# Patient Record
Sex: FEMALE | Race: WHITE | NOT HISPANIC OR LATINO | Employment: FULL TIME | URBAN - METROPOLITAN AREA
[De-identification: names, ages, dates, MRNs, and addresses within clinical notes are randomized per-mention and may not be internally consistent; named-entity substitution may affect disease eponyms.]

---

## 2018-06-04 ENCOUNTER — HOSPITAL ENCOUNTER (OUTPATIENT)
Dept: RADIOLOGY | Facility: MEDICAL CENTER | Age: 43
End: 2018-06-04
Attending: FAMILY MEDICINE
Payer: COMMERCIAL

## 2018-06-04 DIAGNOSIS — Z12.31 VISIT FOR SCREENING MAMMOGRAM: ICD-10-CM

## 2018-06-04 PROCEDURE — 77067 SCR MAMMO BI INCL CAD: CPT

## 2019-10-02 ENCOUNTER — OFFICE VISIT (OUTPATIENT)
Dept: URGENT CARE | Facility: PHYSICIAN GROUP | Age: 44
End: 2019-10-02
Payer: COMMERCIAL

## 2019-10-02 VITALS
OXYGEN SATURATION: 93 % | RESPIRATION RATE: 18 BRPM | TEMPERATURE: 97.5 F | BODY MASS INDEX: 28.82 KG/M2 | DIASTOLIC BLOOD PRESSURE: 88 MMHG | WEIGHT: 173 LBS | HEIGHT: 65 IN | SYSTOLIC BLOOD PRESSURE: 138 MMHG | HEART RATE: 85 BPM

## 2019-10-02 DIAGNOSIS — J45.21 MILD INTERMITTENT REACTIVE AIRWAY DISEASE WITH ACUTE EXACERBATION: ICD-10-CM

## 2019-10-02 DIAGNOSIS — J04.2 LARYNGOTRACHEITIS: ICD-10-CM

## 2019-10-02 PROCEDURE — 99203 OFFICE O/P NEW LOW 30 MIN: CPT | Performed by: EMERGENCY MEDICINE

## 2019-10-02 RX ORDER — ENALAPRIL MALEATE 10 MG/1
10 TABLET ORAL
Refills: 0 | COMMUNITY
Start: 2019-09-16

## 2019-10-02 RX ORDER — FLUTICASONE PROPIONATE 220 UG/1
1 AEROSOL, METERED RESPIRATORY (INHALATION) 2 TIMES DAILY
Qty: 1 INHALER | Refills: 0 | Status: SHIPPED | OUTPATIENT
Start: 2019-10-02 | End: 2021-01-21

## 2019-10-02 RX ORDER — ALBUTEROL SULFATE 90 UG/1
AEROSOL, METERED RESPIRATORY (INHALATION)
Refills: 1 | COMMUNITY
Start: 2019-09-23 | End: 2021-01-21 | Stop reason: SDUPTHER

## 2019-10-02 RX ORDER — INHALER, ASSIST DEVICES
1 SPACER (EA) MISCELLANEOUS ONCE
Qty: 1 EACH | Refills: 0 | Status: SHIPPED | OUTPATIENT
Start: 2019-10-02 | End: 2019-10-02

## 2019-10-02 RX ORDER — PREDNISONE 20 MG/1
TABLET ORAL
Qty: 16 TAB | Refills: 0 | Status: SHIPPED | OUTPATIENT
Start: 2019-10-02 | End: 2021-01-21

## 2019-10-02 RX ORDER — PRAVASTATIN SODIUM 10 MG
TABLET ORAL
Refills: 3 | COMMUNITY
Start: 2019-08-07 | End: 2022-06-30

## 2019-10-02 ASSESSMENT — ENCOUNTER SYMPTOMS
SORE THROAT: 1
COUGH: 1
VOMITING: 0
WHEEZING: 1
ABDOMINAL PAIN: 0
RHINORRHEA: 0
NAUSEA: 0
SHORTNESS OF BREATH: 0
SPUTUM PRODUCTION: 0
DIARRHEA: 0
HEMOPTYSIS: 0
SINUS PAIN: 0
FEVER: 0

## 2019-10-02 NOTE — PROGRESS NOTES
Subjective:      Jennifer Monge is a 44 y.o. female who presents with Cough (with congestion and loss of voice, hx of asthma )            URI    This is a new problem. Episode onset: 8 days. The problem has been gradually worsening. There has been no fever. Associated symptoms include congestion, coughing, a sore throat and wheezing. Pertinent negatives include no abdominal pain, chest pain, diarrhea, ear pain, nausea, plugged ear sensation, rash, rhinorrhea, sinus pain or vomiting. She has tried inhaler use for the symptoms. The treatment provided mild relief.       Review of Systems   Constitutional: Negative for fever.   HENT: Positive for congestion and sore throat. Negative for ear pain, rhinorrhea and sinus pain.         Hoarseness   Respiratory: Positive for cough and wheezing. Negative for hemoptysis, sputum production and shortness of breath.    Cardiovascular: Negative for chest pain.   Gastrointestinal: Negative for abdominal pain, diarrhea, nausea and vomiting.   Skin: Negative for rash.     PMH:  has a past medical history of ASTHMA and Migraine.  MEDS:   Current Outpatient Medications:   •  albuterol 108 (90 Base) MCG/ACT Aero Soln inhalation aerosol, INHALE 2 PUFFS BY MOUTH EVERY 4 HOURS AS NEEDED FOR SHORTNESS OF BREATH WHEEZING, Disp: , Rfl: 1  •  enalapril (VASOTEC) 10 MG Tab, Take 10 mg by mouth., Disp: , Rfl: 0  •  pravastatin (PRAVACHOL) 10 MG Tab, TAKE 1 TABLET BY MOUTH ONCE DAILY . APPOINTMENT REQUIRED FOR FUTURE REFILLS, Disp: , Rfl: 3  •  predniSONE (DELTASONE) 20 MG Tab, Take 3 tablets once a day for 2 days, then 2 tablets once a day for 5 days, Disp: 16 Tab, Rfl: 0  •  Spacer/Aero-Holding Chambers (AEROCHAMBER MV) Misc, 1 Each by Does not apply route Once for 1 dose., Disp: 1 Each, Rfl: 0  •  fluticasone (FLOVENT HFA) 220 MCG/ACT Aerosol, Inhale 1 Puff by mouth 2 times a day., Disp: 1 Inhaler, Rfl: 0  •  paroxetine (PAXIL) 10 MG TABS, One each am., Disp: 30 Tab, Rfl: 1  •  sumatriptan  "(IMITREX) 50 MG TABS, One at onset of symptoms--can repeat X1, Disp: 20 Tab, Rfl: 1  ALLERGIES: No Known Allergies  SURGHX:   Past Surgical History:   Procedure Laterality Date   • LASER ABLATION  2012   • TONSILLECTOMY      age 11   • TUBAL COAGULATION LAPAROSCOPIC BILATERAL       SOCHX:  reports that she has never smoked. She has never used smokeless tobacco. She reports that she does not drink alcohol or use drugs.  FH: family history includes Breast Cancer in her maternal aunt.     Objective:     /88 (BP Location: Right arm, Patient Position: Sitting, BP Cuff Size: Adult)   Pulse 85   Temp 36.4 °C (97.5 °F) (Temporal)   Resp 18   Ht 1.651 m (5' 5\")   Wt 78.5 kg (173 lb)   SpO2 93%   BMI 28.79 kg/m²      Physical Exam   Constitutional: She is oriented to person, place, and time. She appears well-developed and well-nourished. She is cooperative.  Non-toxic appearance. No distress.   HENT:   Head: Normocephalic.   Right Ear: Tympanic membrane and ear canal normal.   Left Ear: Tympanic membrane and ear canal normal.   Nose: No mucosal edema or rhinorrhea.   Mouth/Throat: No trismus in the jaw. No uvula swelling. No oropharyngeal exudate, posterior oropharyngeal edema or posterior oropharyngeal erythema.   Voice hoarse   Eyes: Conjunctivae are normal.   Neck: Trachea normal. Neck supple.   Cardiovascular: Normal rate, regular rhythm and normal heart sounds.   Pulmonary/Chest: No accessory muscle usage. No tachypnea. No respiratory distress. She has no decreased breath sounds. She has wheezes. She has rhonchi. She has no rales.   Lymphadenopathy:     She has no cervical adenopathy.   Neurological: She is alert and oriented to person, place, and time.   Skin: Skin is warm and dry.   Psychiatric: She has a normal mood and affect.               Assessment/Plan:     1. Laryngotracheitis  Recommended supportive care measures, including rest, increasing oral fluid intake and use of over-the-counter medications " for relief of symptoms.    2. Mild intermittent reactive airway disease with acute exacerbation  Continue albuterol with spacer  - predniSONE (DELTASONE) 20 MG Tab; Take 3 tablets once a day for 2 days, then 2 tablets once a day for 5 days  Dispense: 16 Tab; Refill: 0  - Spacer/Aero-Holding Chambers (AEROCHAMBER MV) Misc; 1 Each by Does not apply route Once for 1 dose.  Dispense: 1 Each; Refill: 0  - fluticasone (FLOVENT HFA) 220 MCG/ACT Aerosol; Inhale 1 Puff by mouth 2 times a day.  Dispense: 1 Inhaler; Refill: 0

## 2019-10-02 NOTE — LETTER
October 2, 2019       Patient: Jennifer Monge   YOB: 1975   Date of Visit: 10/2/2019         To Whom It May Concern:    It is my medical opinion that Jennifer Monge should not attend work October 2-4, 2019.    Sincerely,          Junior Lawson M.D.  Electronically Signed

## 2019-12-17 ENCOUNTER — HOSPITAL ENCOUNTER (OUTPATIENT)
Dept: RADIOLOGY | Facility: MEDICAL CENTER | Age: 44
End: 2019-12-17
Attending: NURSE PRACTITIONER
Payer: COMMERCIAL

## 2019-12-17 DIAGNOSIS — Z12.31 VISIT FOR SCREENING MAMMOGRAM: ICD-10-CM

## 2019-12-17 PROCEDURE — 77067 SCR MAMMO BI INCL CAD: CPT

## 2021-01-05 ENCOUNTER — HOSPITAL ENCOUNTER (OUTPATIENT)
Dept: RADIOLOGY | Facility: MEDICAL CENTER | Age: 46
End: 2021-01-05
Attending: OBSTETRICS & GYNECOLOGY
Payer: COMMERCIAL

## 2021-01-05 DIAGNOSIS — Z12.31 VISIT FOR SCREENING MAMMOGRAM: ICD-10-CM

## 2021-01-05 PROCEDURE — 77063 BREAST TOMOSYNTHESIS BI: CPT

## 2021-01-20 ASSESSMENT — ENCOUNTER SYMPTOMS
RESPIRATORY SYMPTOMS COMMENTS: YES
CHEST TIGHTNESS: 1
WHEEZING: 1
DYSPNEA AT REST: 1
SHORTNESS OF BREATH: 1
HEMOPTYSIS: 0

## 2021-01-21 ENCOUNTER — OFFICE VISIT (OUTPATIENT)
Dept: SLEEP MEDICINE | Facility: MEDICAL CENTER | Age: 46
End: 2021-01-21
Payer: COMMERCIAL

## 2021-01-21 VITALS
OXYGEN SATURATION: 94 % | DIASTOLIC BLOOD PRESSURE: 74 MMHG | HEART RATE: 72 BPM | BODY MASS INDEX: 32.17 KG/M2 | WEIGHT: 193.06 LBS | HEIGHT: 65 IN | SYSTOLIC BLOOD PRESSURE: 108 MMHG

## 2021-01-21 DIAGNOSIS — J45.51 SEVERE PERSISTENT ASTHMA WITH ACUTE EXACERBATION: ICD-10-CM

## 2021-01-21 PROCEDURE — 99203 OFFICE O/P NEW LOW 30 MIN: CPT | Performed by: INTERNAL MEDICINE

## 2021-01-21 RX ORDER — ALBUTEROL SULFATE 90 UG/1
2 AEROSOL, METERED RESPIRATORY (INHALATION) EVERY 4 HOURS PRN
Qty: 8.5 G | Refills: 1 | Status: SHIPPED | OUTPATIENT
Start: 2021-01-21

## 2021-01-21 RX ORDER — MONTELUKAST SODIUM 10 MG/1
10 TABLET ORAL
Qty: 90 TAB | Refills: 3 | Status: SHIPPED | OUTPATIENT
Start: 2021-01-21 | End: 2021-04-22 | Stop reason: SDUPTHER

## 2021-01-21 RX ORDER — METHYLPREDNISOLONE 4 MG/1
TABLET ORAL
Qty: 21 TAB | Refills: 0 | Status: SHIPPED | OUTPATIENT
Start: 2021-01-21 | End: 2021-03-16

## 2021-01-21 NOTE — PROGRESS NOTES
Chief Complaint   Patient presents with   • Establish Care     Referred by Miriam KAMARA for Asthma       Problems:   1. Severe persistent asthma with acute exacerbation        Assessment/Plan:   # Severe persistent asthma  -- Clinical presentation and frequent asthma exacerbation consistent with severe persistent asthma requiring intermittent burst of steroids  -- Current ACT score -> 6, suggestive her asthma is not well controlled  -- Start trelegy and singulair daily; trelegy sample provided  -- Start medrol dosepak   -- Cont albuterol as needed shortness of breath/wheezing   -- Check PFTs prior to next clinic visit   -- If no improvement then will check IgE level and CBC with differential to assess for future anti-IL-5 and omalizumab candidacy  -- Discussed about the importance of aggressively treating her sinobronchitis as nasal drip is known to have elevated IL-5 which can stimulates eosinophil colony formation, making her asthma worsen and harder to treat  -- Patient was coached and educated on the proper use of trelegy inhaler and was advised to gargle and rinse after using it   -- Discussed the importance of using face mask while at work to prevent environmental exposure.  -- Need immunization records from PCP   -- RTC 2 months       HPI:  Jennifer Monge is a 45 year old female who is referred to Pulmonary clinic for evaluation of asthma. She is a never smoker. Her medical history is notable for asthma and morbid obesity. She was told that she has asthma at the age of 9 and went away when she was 15. She developed cough, wheezing, chest tightness, and shortness of breath which started two years ago. She was prescribed with albuterol inhaler. Her symptoms remains persistent despite using albuterol 3-4 times per day. Denies chest pain, N/V, fever/chills, or recent exposure to COVID-19. Triggers include pollen, cats, and cold air. She also report waking up multiple times in the middle of the night  struggling to breath and wheezing.      She has two dogs. No family history significant for asthma or lung disease. No known chemical exposure in the past. Never had PFTs completed in the past.       Past Medical History:   Diagnosis Date   • ASTHMA    • Back pain    • Back pain    • Chest tightness    • Chickenpox    • Cough    • Depression    • Difficulty breathing    • Frequent headaches    • Hypertension    • Migraine    • Shortness of breath    • Snoring    • Sweat, sweating, excessive    • Wears glasses    • Wheezing        Past Surgical History:   Procedure Laterality Date   • LASER ABLATION  2012   • SINUSCOPE     • TONSILLECTOMY      age 11   • TUBAL COAGULATION LAPAROSCOPIC BILATERAL         ROS:   Constitutional: Denies fevers, chills, night sweats, fatigue or weight loss  Eyes: Denies vision loss, pain, drainage, double vision  Ears, Nose, Throat: Denies earache, tinnitus, hoarseness  Cardiovascular: Denies chest pain, tightness, palpitations  Respiratory: See HPI  GI: Denies abdominal pain, nausea, vomiting, diarrhea  : Denies frequent urination, hematuria, painful urination  Musculoskeletal: Denies back pain, painful joints, sore muscles  Neurological: Denies headaches, seizures  Skin: Denies rashes, color changes  Psychiatric: Denies depression or thoughts of suicide  Hematologic: Denies bleeding tendency or clotting tendency  Allergic/Immunologic: Denies rhinitis, skin sensitivity    Social History     Socioeconomic History   • Marital status:      Spouse name: Not on file   • Number of children: Not on file   • Years of education: Not on file   • Highest education level: Not on file   Occupational History   • Not on file   Social Needs   • Financial resource strain: Not on file   • Food insecurity     Worry: Not on file     Inability: Not on file   • Transportation needs     Medical: Not on file     Non-medical: Not on file   Tobacco Use   • Smoking status: Never Smoker   • Smokeless  "tobacco: Never Used   Substance and Sexual Activity   • Alcohol use: No   • Drug use: No   • Sexual activity: Yes     Partners: Male   Lifestyle   • Physical activity     Days per week: Not on file     Minutes per session: Not on file   • Stress: Not on file   Relationships   • Social connections     Talks on phone: Not on file     Gets together: Not on file     Attends Zoroastrianism service: Not on file     Active member of club or organization: Not on file     Attends meetings of clubs or organizations: Not on file     Relationship status: Not on file   • Intimate partner violence     Fear of current or ex partner: Not on file     Emotionally abused: Not on file     Physically abused: Not on file     Forced sexual activity: Not on file   Other Topics Concern   • Not on file   Social History Narrative   • Not on file     Patient has no known allergies.  Current Outpatient Medications on File Prior to Visit   Medication Sig Dispense Refill   • albuterol 108 (90 Base) MCG/ACT Aero Soln inhalation aerosol INHALE 2 PUFFS BY MOUTH EVERY 4 HOURS AS NEEDED FOR SHORTNESS OF BREATH WHEEZING  1   • enalapril (VASOTEC) 10 MG Tab Take 10 mg by mouth.  0   • pravastatin (PRAVACHOL) 10 MG Tab TAKE 1 TABLET BY MOUTH ONCE DAILY . APPOINTMENT REQUIRED FOR FUTURE REFILLS  3   • paroxetine (PAXIL) 10 MG TABS One each am. 30 Tab 1   • sumatriptan (IMITREX) 50 MG TABS One at onset of symptoms--can repeat X1 20 Tab 1     No current facility-administered medications on file prior to visit.      /74 (BP Location: Right arm, Patient Position: Sitting, BP Cuff Size: Adult)   Pulse 72   Ht 1.651 m (5' 5\")   Wt 87.6 kg (193 lb 1 oz)   SpO2 94%   Family History   Problem Relation Age of Onset   • Breast Cancer Maternal Aunt    • Lung Cancer Paternal Grandmother      Immunization History   Administered Date(s) Administered   • Tdap Vaccine 11/21/2014         Physical Exam:  General: NAD. Speaking in full sentence.   HEENT: RUSSEL BIGGS, " no scleral icterus, no nasal or oral lesions  Neck: No thyromegaly, no adenopathy, no bruits  Mallampatti: Grade II  Lungs: Equal breath sounds, no crackles. Good air entry with bilateral expiratory wheezes.   Heart: Regular rate and rhythm, no gallops or murmurs  Abdomen: Soft, benign, no organomegaly  Extremities: No clubbing, cyanosis, or edema  Neurologic: Cranial nerve, motor, and sensory exam are normal    Susan Choudhury MD   Pulmonary Critical Care   [facility]

## 2021-03-16 ENCOUNTER — APPOINTMENT (OUTPATIENT)
Dept: SLEEP MEDICINE | Facility: MEDICAL CENTER | Age: 46
End: 2021-03-16
Payer: COMMERCIAL

## 2021-03-16 ENCOUNTER — OFFICE VISIT (OUTPATIENT)
Dept: NEUROLOGY | Facility: MEDICAL CENTER | Age: 46
End: 2021-03-16
Attending: NURSE PRACTITIONER
Payer: COMMERCIAL

## 2021-03-16 VITALS
WEIGHT: 193.5 LBS | SYSTOLIC BLOOD PRESSURE: 130 MMHG | HEART RATE: 89 BPM | OXYGEN SATURATION: 95 % | TEMPERATURE: 98.4 F | HEIGHT: 65 IN | DIASTOLIC BLOOD PRESSURE: 74 MMHG | BODY MASS INDEX: 32.24 KG/M2 | RESPIRATION RATE: 12 BRPM

## 2021-03-16 DIAGNOSIS — G44.59 OTHER COMPLICATED HEADACHE SYNDROME: ICD-10-CM

## 2021-03-16 DIAGNOSIS — R55 SYNCOPE, UNSPECIFIED SYNCOPE TYPE: ICD-10-CM

## 2021-03-16 DIAGNOSIS — R42 VERTIGO: ICD-10-CM

## 2021-03-16 DIAGNOSIS — G43.109 MIGRAINE WITH AURA AND WITHOUT STATUS MIGRAINOSUS, NOT INTRACTABLE: ICD-10-CM

## 2021-03-16 PROCEDURE — 99215 OFFICE O/P EST HI 40 MIN: CPT | Performed by: NURSE PRACTITIONER

## 2021-03-16 PROCEDURE — 99212 OFFICE O/P EST SF 10 MIN: CPT | Performed by: NURSE PRACTITIONER

## 2021-03-16 RX ORDER — TOPIRAMATE SPINKLE 25 MG/1
50 CAPSULE ORAL
Qty: 180 CAPSULE | Refills: 3 | Status: SHIPPED | OUTPATIENT
Start: 2021-03-16 | End: 2022-05-10

## 2021-03-16 RX ORDER — PAROXETINE HYDROCHLORIDE 20 MG/1
20 TABLET, FILM COATED ORAL
COMMUNITY
Start: 2021-03-08

## 2021-03-16 RX ORDER — SUMATRIPTAN 25 MG/1
TABLET, FILM COATED ORAL
COMMUNITY
Start: 2021-03-07 | End: 2021-03-16

## 2021-03-16 RX ORDER — SUMATRIPTAN 100 MG/1
TABLET, FILM COATED ORAL
Qty: 9 TABLET | Refills: 11 | Status: SHIPPED | OUTPATIENT
Start: 2021-03-16 | End: 2022-06-30 | Stop reason: SDUPTHER

## 2021-03-16 ASSESSMENT — ENCOUNTER SYMPTOMS
DEPRESSION: 1
HEARTBURN: 0
DIZZINESS: 1
NERVOUS/ANXIOUS: 0
SPEECH CHANGE: 1
COUGH: 0
BACK PAIN: 0
SHORTNESS OF BREATH: 0
FOCAL WEAKNESS: 0
BRUISES/BLEEDS EASILY: 0
WEAKNESS: 0
SENSORY CHANGE: 1
DIARRHEA: 0
CHILLS: 0
NECK PAIN: 1
BLURRED VISION: 1
VOMITING: 0
CONSTIPATION: 0
FEVER: 0
NAUSEA: 0
HEADACHES: 1

## 2021-03-16 NOTE — PATIENT INSTRUCTIONS
IF you do not hear from radiology to schedule MRI within 2 weeks, call [phone] to schedule     For Rescue:    Sumatriptan 100mg tablet Take 1/2  tablet by mouth at onset of headache, may repeat dose in 2 hours if unrelieved.  Do not exceed more than 2 tablets in 24 hours. No more than 9 tablets per month.          For Prevention:    Topiramate (Topamax)  25mg (1 tb) by mouth every night x 7 nights, then 50mg (2 tabs) by mouth every night, i       I recommend the following over the counter supplements every night at bedtime:  Start magnesium oxide 400mg gel cap,  by mouth every night, may take extra dose if needed for headache (over the counter), hold for diarrhea         Start Riboflavin (Vitamin B2) 400mg by mouth every night (over the counter),may turn urine bright yellow         Start COQ 10, take 300mg every night. (over the counter)          Attempt to go to bed and get up at the same time every night           Eat meals on regular basis            Stay hydrated.             Aerobic exercise 30 minutes daily             Avoid aged or smoked foods, avoid processed foods, red wine, aged cheese              Keep headache diary, include foods that you may have eaten.             Avoid overusing over the counter medications:  Do not take more than 500mg acetaminophen (tylenol), more than 4 times weekly, more frequent or larger doses are associated with medication overuse headache.            Migraine Headache  A migraine headache is a very strong throbbing pain on one side or both sides of your head. This type of headache can also cause other symptoms. It can last from 4 hours to 3 days. Talk with your doctor about what things may bring on (trigger) this condition.  What are the causes?  The exact cause of this condition is not known. This condition may be triggered or caused by:  · Drinking alcohol.  · Smoking.  · Taking medicines, such as:  ? Medicine used to treat chest pain (nitroglycerin).  ? Birth  control pills.  ? Estrogen.  ? Some blood pressure medicines.  · Eating or drinking certain products.  · Doing physical activity.  Other things that may trigger a migraine headache include:  · Having a menstrual period.  · Pregnancy.  · Hunger.  · Stress.  · Not getting enough sleep or getting too much sleep.  · Weather changes.  · Tiredness (fatigue).  What increases the risk?  · Being 25-55 years old.  · Being female.  · Having a family history of migraine headaches.  · Being .  · Having depression or anxiety.  · Being very overweight.  What are the signs or symptoms?  · A throbbing pain. This pain may:  ? Happen in any area of the head, such as on one side or both sides.  ? Make it hard to do daily activities.  ? Get worse with physical activity.  ? Get worse around bright lights or loud noises.  · Other symptoms may include:  ? Feeling sick to your stomach (nauseous).  ? Vomiting.  ? Dizziness.  ? Being sensitive to bright lights, loud noises, or smells.  · Before you get a migraine headache, you may get warning signs (an aura). An aura may include:  ? Seeing flashing lights or having blind spots.  ? Seeing bright spots, halos, or zigzag lines.  ? Having tunnel vision or blurred vision.  ? Having numbness or a tingling feeling.  ? Having trouble talking.  ? Having weak muscles.  · Some people have symptoms after a migraine headache (postdromal phase), such as:  ? Tiredness.  ? Trouble thinking (concentrating).  How is this treated?  · Taking medicines that:  ? Relieve pain.  ? Relieve the feeling of being sick to your stomach.  ? Prevent migraine headaches.  · Treatment may also include:  ? Having acupuncture.  ? Avoiding foods that bring on migraine headaches.  ? Learning ways to control your body functions (biofeedback).  ? Therapy to help you know and deal with negative thoughts (cognitive behavioral therapy).  Follow these instructions at home:  Medicines  · Take over-the-counter and prescription  medicines only as told by your doctor.  · Ask your doctor if the medicine prescribed to you:  ? Requires you to avoid driving or using heavy machinery.  ? Can cause trouble pooping (constipation). You may need to take these steps to prevent or treat trouble pooping:  § Drink enough fluid to keep your pee (urine) pale yellow.  § Take over-the-counter or prescription medicines.  § Eat foods that are high in fiber. These include beans, whole grains, and fresh fruits and vegetables.  § Limit foods that are high in fat and sugar. These include fried or sweet foods.  Lifestyle  · Do not drink alcohol.  · Do not use any products that contain nicotine or tobacco, such as cigarettes, e-cigarettes, and chewing tobacco. If you need help quitting, ask your doctor.  · Get at least 8 hours of sleep every night.  · Limit and deal with stress.  General instructions         · Keep a journal to find out what may bring on your migraine headaches. For example, write down:  ? What you eat and drink.  ? How much sleep you get.  ? Any change in what you eat or drink.  ? Any change in your medicines.  · If you have a migraine headache:  ? Avoid things that make your symptoms worse, such as bright lights.  ? It may help to lie down in a dark, quiet room.  ? Do not drive or use heavy machinery.  ? Ask your doctor what activities are safe for you.  · Keep all follow-up visits as told by your doctor. This is important.  Contact a doctor if:  · You get a migraine headache that is different or worse than others you have had.  · You have more than 15 headache days in one month.  Get help right away if:  · Your migraine headache gets very bad.  · Your migraine headache lasts longer than 72 hours.  · You have a fever.  · You have a stiff neck.  · You have trouble seeing.  · Your muscles feel weak or like you cannot control them.  · You start to lose your balance a lot.  · You start to have trouble walking.  · You pass out (faint).  · You have a  seizure.  Summary  · A migraine headache is a very strong throbbing pain on one side or both sides of your head. These headaches can also cause other symptoms.  · This condition may be treated with medicines and changes to your lifestyle.  · Keep a journal to find out what may bring on your migraine headaches.  · Contact a doctor if you get a migraine headache that is different or worse than others you have had.  · Contact your doctor if you have more than 15 headache days in a month.  This information is not intended to replace advice given to you by your health care provider. Make sure you discuss any questions you have with your health care provider.  Document Released: 09/26/2009 Document Revised: 04/10/2020 Document Reviewed: 01/30/2020  Elsevier Patient Education © 2020 [employer]  How to Perform the Epley Maneuver  The Epley maneuver is an exercise that relieves symptoms of vertigo. Vertigo is the feeling that you or your surroundings are moving when they are not. When you feel vertigo, you may feel like the room is spinning and have trouble walking. Dizziness is a little different than vertigo. When you are dizzy, you may feel unsteady or light-headed.  You can do this maneuver at home whenever you have symptoms of vertigo. You can do it up to 3 times a day until your symptoms go away.  Even though the Epley maneuver may relieve your vertigo for a few weeks, it is possible that your symptoms will return. This maneuver relieves vertigo, but it does not relieve dizziness.  What are the risks?  If it is done correctly, the Epley maneuver is considered safe. Sometimes it can lead to dizziness or nausea that goes away after a short time. If you develop other symptoms, such as changes in vision, weakness, or numbness, stop doing the maneuver and call your health care provider.  How to perform the Epley maneuver  1. Sit on the edge of a bed or table with your back straight and your legs extended or hanging over  the edge of the bed or table.  2. Turn your head group home toward the affected ear or side.  3. Lie backward quickly with your head turned until you are lying flat on your back. You may want to position a pillow under your shoulders.  4. Hold this position for 30 seconds. You may experience an attack of vertigo. This is normal.  5. Turn your head to the opposite direction until your unaffected ear is facing the floor.  6. Hold this position for 30 seconds. You may experience an attack of vertigo. This is normal. Hold this position until the vertigo stops.  7. Turn your whole body to the same side as your head. Hold for another 30 seconds.  8. Sit back up.  You can repeat this exercise up to 3 times a day.  Follow these instructions at home:  · After doing the Epley maneuver, you can return to your normal activities.  · Ask your health care provider if there is anything you should do at home to prevent vertigo. He or she may recommend that you:  ? Keep your head raised (elevated) with two or more pillows while you sleep.  ? Do not sleep on the side of your affected ear.  ? Get up slowly from bed.  ? Avoid sudden movements during the day.  ? Avoid extreme head movement, like looking up or bending over.  Contact a health care provider if:  · Your vertigo gets worse.  · You have other symptoms, including:  ? Nausea.  ? Vomiting.  ? Headache.  Get help right away if:  · You have vision changes.  · You have a severe or worsening headache or neck pain.  · You cannot stop vomiting.  · You have new numbness or weakness in any part of your body.  Summary  · Vertigo is the feeling that you or your surroundings are moving when they are not.  · The Epley maneuver is an exercise that relieves symptoms of vertigo.  · If the Epley maneuver is done correctly, it is considered safe. You can do it up to 3 times a day.  This information is not intended to replace advice given to you by your health care provider. Make sure you discuss any  questions you have with your health care provider.  Document Released: 12/23/2014 Document Revised: 11/30/2018 Document Reviewed: 11/07/2017  Elsevier Patient Education © 2020 [employer]

## 2021-03-16 NOTE — PROGRESS NOTES
Subjective:    HPI  Jennifer Monge is a 45 y.o. female who presents with migraines.      She was referred by Dr. Yen Logan for increasing frequency of migraines, prior to 3 months ago she had migraines about once per week, now they are 2-3 times per week.  She feels like menopause may be increasing her migraines.     Her migraines originally started at age 13,the onset was with grand mal seizures, she only had seizures for about 2 years, she doesn't remember if she took AED.    She has been having dizzy/virtiginous spells for a couple of months, she recently had an episode where she passed out for less than 1 minute. She had been standing when she passed out. She feels like she had been well hydrated, had not had any changes in her routine. She had the vertigo for about 4 days after that, sometimes it is accompanied by nausea, no vomiting. If she lays down and is still, it goes away, if she turns her head or gets up the vertigo returns.           Age at Onset: 13, started with seizure, went away with first child, in the past 3 months they became more frequent  Triggers:  Unable to identify.  Alleviating factors: dark quiet room, sleeping.   Meds tried and result: Sumatriptan 25mg is no longer working.   Hormones:  Estrogen cream, testosterone cream  Caffeine use:  1 cup of coffee daily.   OTC medications--frequency:  Excedrin migraine/advil 3-4 times per week.   How many days per month: 2-3 days per week  Missed days of school/work in past 6 months:   10   Quality:  Pain is bi-frontal, radiates to whole head, pulsating, pain 8/10, lasts about 6 hours.   N&V:  Both   Photo/phonophobia:  Both  Associated symptoms:  Blurred vision, numbness of hands, slurred speech.   Aura: loses in left eye, pain starts about 15 minutes after losing vision.  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   Family Hx. Denies family  History of migraine.       Review of Systems   Constitutional: Negative for chills and fever.   HENT: Positive for hearing loss.    Eyes: Positive for blurred vision.   Respiratory: Negative for cough and shortness of breath.    Cardiovascular: Negative for chest pain.   Gastrointestinal: Negative for constipation, diarrhea, heartburn, nausea and vomiting.   Genitourinary: Negative.    Musculoskeletal: Positive for neck pain. Negative for back pain.   Skin: Negative for rash.   Neurological: Positive for dizziness, sensory change, speech change and headaches. Negative for focal weakness and weakness.   Endo/Heme/Allergies: Does not bruise/bleed easily.   Psychiatric/Behavioral: Positive for depression. The patient is not nervous/anxious.          Current Outpatient Medications on File Prior to Visit   Medication Sig Dispense Refill   • PARoxetine (PAXIL) 20 MG Tab Take 20 mg by mouth.     • SUMAtriptan (IMITREX) 25 MG Tab tablet TAKE ONE TABLET BY MOUTH AT ONSET OF MIGRAINE. IF SYMPTOMS PERSIST A SECOND DOSE MAY BE TAKEN IN 2 HOURS. DO NOT EXCEED 200MG IN A 24 HOUR P     • NON SPECIFIED Bi estrogen and bi testosterone cream once daily     • Fluticasone-Umeclidin-Vilant (TRELEGY ELLIPTA) 100-62.5-25 MCG/INH AEROSOL POWDER, BREATH ACTIVATED Inhale 1 Puff every day. 1 Each 0   • montelukast (SINGULAIR) 10 MG Tab Take 1 Tab by mouth every bedtime. Take 1 tablet by mouth nightly at bedtime. 90 Tab 3   • albuterol 108 (90 Base) MCG/ACT Aero Soln inhalation aerosol Inhale 2 Puffs every four hours as needed for Shortness of Breath. 8.5 g 1   • enalapril (VASOTEC) 10 MG Tab Take 10 mg by mouth.  0   • pravastatin (PRAVACHOL) 10 MG Tab TAKE 1 TABLET BY MOUTH ONCE DAILY . APPOINTMENT REQUIRED FOR FUTURE REFILLS  3     No current facility-administered medications on file prior to visit.     Past Medical History:   Diagnosis Date   • ASTHMA    • Back pain    • Back pain    • Chest  "tightness    • Chickenpox    • Cough    • Depression    • Difficulty breathing    • Frequent headaches    • Hypertension    • Migraine    • Shortness of breath    • Snoring    • Sweat, sweating, excessive    • Wears glasses    • Wheezing         Objective:   Encounter Vitals  Standard Vitals  Vitals  Blood Pressure: 130/74  Temperature: 36.9 °C (98.4 °F)  Temp src: Temporal  Pulse: 89  Respiration: 12  Pulse Oximetry: 95 %  Height: 165.1 cm (5' 5\")  Weight: 87.8 kg (193 lb 8 oz)  Encounter Vitals  Temperature: 36.9 °C (98.4 °F)  Temp src: Temporal  Blood Pressure: 130/74  Pulse: 89  Respiration: 12  Pulse Oximetry: 95 %  Weight: 87.8 kg (193 lb 8 oz)  Height: 165.1 cm (5' 5\")  BMI (Calculated): 32.2    Physical Exam    Constitutional:  Alert, no apparent distress,  Psych:   mood and affect WNL  Neuro:  Oriented X 4, speech fluent, naming and memory intact  Muskuloskeletal:  Moves all extremities equally, strength 5/5 bilaterally, no drift  CN II: Visual fields are full to confrontation. Fundoscopic exam is normal with sharp discs and no vascular changes. Pupils are 4 mm and briskly reactive to light.   CN III, IV, VI  EOMs intact, no ptosis  CN V: Facial sensation is intact to pinprick in all 3 divisions bilaterally. Corneal responses are intact.  CN VII: Face is symmetric with normal eye closure and smile.  CN VII: Hearing is normal to rubbing fingers  CN IX, X: Palate elevates symmetrically. Phonation is normal.  CN XI: Head turning and shoulder shrug are intact  CN XII: Tongue is midline with normal movements and no atrophy.                           Sensation to PP equal bilaterally                 No limb ataxia with finger to nose and heel to shin                 Ambulates with steady gait.                 Rhomberg negative                Biceps,brachioradialis, tricep, patellar and ankle reflex all 1+     Cardiovascular:    S1S2, no abnormal rhythm auscultated, no peripheral edema  Neck:                     " No carotid bruits noted   Pulmonary:            Respirations easy, lungs clear to auscultation all fields.     Skin:                     No obvious rashes.       Assessment/Plan:   1. Migraine with aura and without status migrainosus, not intractable    Increased frequency, needs daily preventative:    topiramate (Topamax)  25mg (1 tab) by mouth every night x 7 nights, then 50mg (2 tabs) by mouth every night, discussed side effects.     Increase Sumatriptan 100mg Take 1/2  tablet po at onset of headache, may repeat dose in 2 hours if unrelieved.  Do not exceed more than 2 tablets in 24 hours. No more than 9 per months.        I recommend the following over the counter supplements every night at bedtime:  Start magnesium oxide 400mg by mouth every night, may take extra dose if needed for headache (over the counter), hold for diarrhea         Start Riboflavin (Vitamin B2) 400mg by mouth every night (over the counter),may turn urine bright yellow         Start COQ 10, take 300mg every night. (over the counter)          Attempt to go to bed and get up at the same time every night           Eat meals on regular basis            Stay hydrated.             Aerobic exercise 30 minutes daily             Avoid aged or smoked foods, avoid processed foods, red wine, aged cheese              Keep headache diary, include foods that you may have eaten.             Avoid overusing over the counter medications:  Do not take more than 500mg acetaminophen (tylenol), more than 4 times weekly, more frequent or larger doses are associated with medication overuse headache.      2. Vertigo  Discussed Epley maneurver    3. Syncopal episode    Will check MRI and EEG    I spent 51 minutes caring for patient, my time includes reviewing the chart, obtaining current HPI, exam, discussion of disease process, ordering testing and medications and counseling.      I counseled patient on migraine triggers, lifestyle changes, medication overuse,  supplements and medication side effects.

## 2021-03-17 ENCOUNTER — NON-PROVIDER VISIT (OUTPATIENT)
Dept: SLEEP MEDICINE | Facility: MEDICAL CENTER | Age: 46
End: 2021-03-17
Attending: INTERNAL MEDICINE
Payer: COMMERCIAL

## 2021-03-17 VITALS — BODY MASS INDEX: 32.32 KG/M2 | HEIGHT: 65 IN | WEIGHT: 194 LBS

## 2021-03-17 DIAGNOSIS — J45.51 SEVERE PERSISTENT ASTHMA WITH ACUTE EXACERBATION: ICD-10-CM

## 2021-03-17 PROCEDURE — 94060 EVALUATION OF WHEEZING: CPT | Performed by: INTERNAL MEDICINE

## 2021-03-17 PROCEDURE — 94729 DIFFUSING CAPACITY: CPT | Performed by: INTERNAL MEDICINE

## 2021-03-17 PROCEDURE — 94726 PLETHYSMOGRAPHY LUNG VOLUMES: CPT | Performed by: INTERNAL MEDICINE

## 2021-03-17 ASSESSMENT — PULMONARY FUNCTION TESTS
FEV1/FVC_PERCENT_PREDICTED: 93
FEV1: 2.87
FEV1/FVC_PERCENT_LLN: 68
FVC: 3.78
FEV1_PERCENT_CHANGE: 10
FVC_PERCENT_PREDICTED: 93
FEV1/FVC_PREDICTED: 81
FEV1_PREDICTED: 2.96
FEV1/FVC_PERCENT_PREDICTED: 94
FEV1/FVC_PERCENT_PREDICTED: 80
FEV1_PERCENT_PREDICTED: 96
FVC_PERCENT_PREDICTED: 102
FEV1/FVC_PERCENT_PREDICTED: 94
FEV1: 2.59
FEV1/FVC: 75
FVC_LLN: 3.07
FEV1/FVC_PERCENT_CHANGE: 0
FEV1_PERCENT_PREDICTED: 87
FEV1/FVC_PERCENT_PREDICTED: 93
FVC_PREDICTED: 3.68
FEV1/FVC: 75.93
FEV1_PERCENT_CHANGE: 10
FEV1/FVC: 75
FEV1/FVC: 76
FEV1_LLN: 2.47
FEV1/FVC_PERCENT_CHANGE: 100
FVC: 3.44

## 2021-03-17 NOTE — PROCEDURES
Technician: Savannah Estevez RRT   Good patient effort & cooperation.  The results of this test meet the ATS/ERS standards for acceptability & reproducibility.  Test was performed on the Fleep Body Plethysmograph-Elite DX system.  Predicted equations for Spirometry are GLI-2012, ITS for lung volumes, and GLI-2017 for DLCO.  The DLCO was uncorrected for Hgb.  A bronchodilator of Ventolin HFA -2puffs via spacer administered.  DLCO performed during dilation period.    Interpretation;   1.  Baseline spirometry shows normal airflows.  2.  There is significant bronchodilator response with improvement in both FEV1 and FVC.  3.  Lung volumes are within normal limits.  4.  DLCO is mildly elevated at 125% predicted.  Normal pulmonary function testing with only mild concavity to expiratory flow volume loop positive bronchodilator response which may be consistent with a diagnosis of reactive airways disease.  Suggest clinical correlation.

## 2021-03-22 ENCOUNTER — APPOINTMENT (OUTPATIENT)
Dept: SLEEP MEDICINE | Facility: MEDICAL CENTER | Age: 46
End: 2021-03-22
Payer: COMMERCIAL

## 2021-03-22 NOTE — PROGRESS NOTES
Pulmonary Clinic Note    Chief Complaint:  No chief complaint on file.    HPI:   Jennifer Monge is a very pleasant 45 y.o. female never smoker who is followed in our clinic for severe persistent asthma last seen by Dr Choudhury in 1/2021.    Asthma since age 9. Tonsillectomy at age 11. Asthma resolved as young adult, has since returned in last two years. Symptoms currently are cough, wheezing, chest tightness and dyspnea. Triggers: pollen, cats, and cold air. Has two dogs at home.  Comorbidities include morbid obesity. There is no height or weight on file to calculate BMI. Other pertinent history includes migraines and seizure disorder, followed by neurology. Recently started on topamax and sumatriptan increased.    No family history significant for asthma or lung disease. No known chemical exposure in the past.    Interval events since last visit:  Current regimen: Trelegy, Singulair, medrol dose pack started last visit  Current albuterol use: ***  No prior CXR ***  PFTs 3/17: no obstruction, partial BD response in FVC, FEV1, and midflow rates; normal lung volumes aside from reduced ERV; supranormal diffusion capacity    If no improvement then will check IgE level and CBC with differential to assess for future anti-IL-5 and omalizumab candidacy  -- Discussed about the importance of aggressively treating her sinobronchitis as nasal drip is known to have elevated IL-5 which can stimulates eosinophil colony formation, making her asthma worsen and harder to treat  -- Need immunization records from PCP     she has not undergone a formal allergic workup ***    Exacerbations this year:  MMRC Grade:  NYHA Class:    MMRC Grade:   0: Breathless only during strenuous exercise  1: Short of breath when hurrying or going up a small hill  2: Walks slower than friends due to breathlessness, has to stop at own pace  3: Stops to catch breath on level ground after 100m  4: Breathless with ambulating around house or ADLs    NYHA Class:    I: no symptoms with activity. Normal  II. Mild symptoms with normal physical activity and comfortable at rest.  III: Moderate symptoms with less than normal physical activity. Only comfortable at rest  IV: Severe symptoms with symptoms of heart failure, even at rest.    Past Medical History:   Diagnosis Date   • ASTHMA    • Back pain    • Back pain    • Chest tightness    • Chickenpox    • Cough    • Depression    • Difficulty breathing    • Frequent headaches    • Hypertension    • Migraine    • Shortness of breath    • Snoring    • Sweat, sweating, excessive    • Wears glasses    • Wheezing        Past Surgical History:   Procedure Laterality Date   • LASER ABLATION  2012   • SINUSCOPE     • TONSILLECTOMY      age 11   • TUBAL COAGULATION LAPAROSCOPIC BILATERAL         Social History     Socioeconomic History   • Marital status:      Spouse name: Not on file   • Number of children: Not on file   • Years of education: Not on file   • Highest education level: Not on file   Occupational History   • Not on file   Tobacco Use   • Smoking status: Never Smoker   • Smokeless tobacco: Never Used   Substance and Sexual Activity   • Alcohol use: No   • Drug use: No   • Sexual activity: Yes     Partners: Male   Other Topics Concern   • Not on file   Social History Narrative   • Not on file     Social Determinants of Health     Financial Resource Strain:    • Difficulty of Paying Living Expenses:    Food Insecurity:    • Worried About Running Out of Food in the Last Year:    • Ran Out of Food in the Last Year:    Transportation Needs:    • Lack of Transportation (Medical):    • Lack of Transportation (Non-Medical):    Physical Activity:    • Days of Exercise per Week:    • Minutes of Exercise per Session:    Stress:    • Feeling of Stress :    Social Connections:    • Frequency of Communication with Friends and Family:    • Frequency of Social Gatherings with Friends and Family:    • Attends [employer] Services:    •  Active Member of Clubs or Organizations:    • Attends Club or Organization Meetings:    • Marital Status:    Intimate Partner Violence:    • Fear of Current or Ex-Partner:    • Emotionally Abused:    • Physically Abused:    • Sexually Abused:           Family History   Problem Relation Age of Onset   • Breast Cancer Maternal Aunt    • Lung Cancer Paternal Grandmother        Current Outpatient Medications on File Prior to Visit   Medication Sig Dispense Refill   • PARoxetine (PAXIL) 20 MG Tab Take 20 mg by mouth.     • NON SPECIFIED Bi estrogen and bi testosterone cream once daily     • topiramate (TOPAMAX) 25 MG capsule Take 2 Capsules by mouth every bedtime. 180 capsule 3   • sumatriptan (IMITREX) 100 MG tablet Take 1/2  tablet po at onset of headache, may repeat dose in 2 hours if unrelieved.  Do not exceed more than 2 tablets in 24 hours. 9 tablet 11   • Fluticasone-Umeclidin-Vilant (TRELEGY ELLIPTA) 100-62.5-25 MCG/INH AEROSOL POWDER, BREATH ACTIVATED Inhale 1 Puff every day. 1 Each 0   • montelukast (SINGULAIR) 10 MG Tab Take 1 Tab by mouth every bedtime. Take 1 tablet by mouth nightly at bedtime. 90 Tab 3   • albuterol 108 (90 Base) MCG/ACT Aero Soln inhalation aerosol Inhale 2 Puffs every four hours as needed for Shortness of Breath. 8.5 g 1   • enalapril (VASOTEC) 10 MG Tab Take 10 mg by mouth.  0   • pravastatin (PRAVACHOL) 10 MG Tab TAKE 1 TABLET BY MOUTH ONCE DAILY . APPOINTMENT REQUIRED FOR FUTURE REFILLS  3     No current facility-administered medications on file prior to visit.       Allergies: Patient has no known allergies.      ROS:   ROS    Vitals:  There were no vitals taken for this visit.    Physical Exam:  Physical Exam    Laboratory Data:    PFTs as reviewed by me personally show:  PFTs 3/17: no obstruction, partial BD response in FVC, FEV1, and midflow rates; normal lung volumes aside from reduced ERV; supranormal diffusion capacity'    Imaging as reviewed by me personally show:    None for  review    Assessment/Plan:    Problem List Items Addressed This Visit     None        No follow-ups on file.     This note was generated using voice recognition software which has a chance of producing errors of grammar and possibly content.  I have made every reasonable attempt to find and correct any obvious errors, but it should be expected that some may not be found prior to finalization of this note.  __________  Brendan Garcia MD  Pulmonary and Critical Care Medicine  [facility]

## 2021-03-25 ENCOUNTER — APPOINTMENT (OUTPATIENT)
Dept: RADIOLOGY | Facility: MEDICAL CENTER | Age: 46
End: 2021-03-25
Attending: NURSE PRACTITIONER
Payer: COMMERCIAL

## 2021-03-25 ENCOUNTER — TELEPHONE (OUTPATIENT)
Dept: NEUROLOGY | Facility: MEDICAL CENTER | Age: 46
End: 2021-03-25

## 2021-04-22 ENCOUNTER — OFFICE VISIT (OUTPATIENT)
Dept: SLEEP MEDICINE | Facility: MEDICAL CENTER | Age: 46
End: 2021-04-22
Payer: COMMERCIAL

## 2021-04-22 VITALS
HEIGHT: 65 IN | WEIGHT: 191.38 LBS | DIASTOLIC BLOOD PRESSURE: 66 MMHG | BODY MASS INDEX: 31.89 KG/M2 | OXYGEN SATURATION: 95 % | SYSTOLIC BLOOD PRESSURE: 106 MMHG | HEART RATE: 85 BPM

## 2021-04-22 DIAGNOSIS — Z23 NEED FOR PNEUMOCOCCAL VACCINATION: ICD-10-CM

## 2021-04-22 DIAGNOSIS — J45.40 MODERATE PERSISTENT ASTHMA WITHOUT COMPLICATION: ICD-10-CM

## 2021-04-22 PROCEDURE — 90732 PPSV23 VACC 2 YRS+ SUBQ/IM: CPT | Performed by: INTERNAL MEDICINE

## 2021-04-22 PROCEDURE — 99214 OFFICE O/P EST MOD 30 MIN: CPT | Mod: 25 | Performed by: INTERNAL MEDICINE

## 2021-04-22 PROCEDURE — 90471 IMMUNIZATION ADMIN: CPT | Performed by: INTERNAL MEDICINE

## 2021-04-22 RX ORDER — MONTELUKAST SODIUM 10 MG/1
10 TABLET ORAL
Qty: 90 TABLET | Refills: 11 | Status: SHIPPED | OUTPATIENT
Start: 2021-04-22 | End: 2022-05-10

## 2021-04-22 ASSESSMENT — ENCOUNTER SYMPTOMS
DIZZINESS: 0
WHEEZING: 0
FEVER: 0
SENSORY CHANGE: 0
STRIDOR: 0
FOCAL WEAKNESS: 0
PSYCHIATRIC NEGATIVE: 1
WEIGHT LOSS: 0
MYALGIAS: 0
COUGH: 0
ABDOMINAL PAIN: 0
EYE PAIN: 0
SPUTUM PRODUCTION: 0
CHILLS: 0
ORTHOPNEA: 0
HEADACHES: 0
LOSS OF CONSCIOUSNESS: 0
DIARRHEA: 0
EYE DISCHARGE: 0
SINUS PAIN: 0
VOMITING: 0
SHORTNESS OF BREATH: 0
SORE THROAT: 0
NAUSEA: 0

## 2021-04-22 NOTE — PROGRESS NOTES
Pulmonary Clinic Note    Chief Complaint:  Chief Complaint   Patient presents with   • Asthma     Last seen 01/21/21   • Results     PFT 03/17/21   • Medication Management     Trelegy     HPI:   Jennifer Monge is a very pleasant 46 y.o. female never smoker who is followed in our clinic for severe persistent asthma last seen by Dr Choudhury in 1/2021.    History of asthma since age 9.  Symptoms resolved as an adult, however last 2 years developed worsening cough, wheezing and chest tightness.  She was prescribed a rescue inhaler with minimal relief. + nighttime symptoms. Triggers include pollen, cats and cold air.  Comorbidities include obesity Body mass index is 31.85 kg/m².      Fortunately, she has not required hospitalization for her asthma/respiratory symptoms as an adult    Past medical history notable for migraines, hypertension, dyslipidemia    Interval events since last clinic visit:  Started Trelegy and Singulair last visit and feeling fantastic. Using albuterol one time per week  Counseled about sinus hygiene although this is improved as well, does not use routinely due to nosebleeds    PFT 3/2021 shows normal airflows on spirometry, significant BD response in FEV1, FVC and midflow rates.  Prebronchodilator FEV1 2.59 L, 87% predicted DLCO 125% predicted.    Past Medical History:   Diagnosis Date   • ASTHMA    • Back pain    • Back pain    • Chest tightness    • Chickenpox    • Cough    • Depression    • Difficulty breathing    • Frequent headaches    • Hypertension    • Migraine    • Shortness of breath    • Snoring    • Sweat, sweating, excessive    • Wears glasses    • Wheezing        Past Surgical History:   Procedure Laterality Date   • LASER ABLATION  2012   • SINUSCOPE     • TONSILLECTOMY      age 11   • TUBAL COAGULATION LAPAROSCOPIC BILATERAL         Social History     Socioeconomic History   • Marital status:      Spouse name: Not on file   • Number of children: Not on file   • Years of  education: Not on file   • Highest education level: Not on file   Occupational History   • Not on file   Tobacco Use   • Smoking status: Never Smoker   • Smokeless tobacco: Never Used   Substance and Sexual Activity   • Alcohol use: No   • Drug use: No   • Sexual activity: Yes     Partners: Male   Other Topics Concern   • Not on file   Social History Narrative   • Not on file     Social Determinants of Health     Financial Resource Strain:    • Difficulty of Paying Living Expenses:    Food Insecurity:    • Worried About Running Out of Food in the Last Year:    • Ran Out of Food in the Last Year:    Transportation Needs:    • Lack of Transportation (Medical):    • Lack of Transportation (Non-Medical):    Physical Activity:    • Days of Exercise per Week:    • Minutes of Exercise per Session:    Stress:    • Feeling of Stress :    Social Connections:    • Frequency of Communication with Friends and Family:    • Frequency of Social Gatherings with Friends and Family:    • Attends Rastafarian Services:    • Active Member of Clubs or Organizations:    • Attends Club or Organization Meetings:    • Marital Status:    Intimate Partner Violence:    • Fear of Current or Ex-Partner:    • Emotionally Abused:    • Physically Abused:    • Sexually Abused:           Family History   Problem Relation Age of Onset   • Breast Cancer Maternal Aunt    • Lung Cancer Paternal Grandmother        Current Outpatient Medications on File Prior to Visit   Medication Sig Dispense Refill   • PARoxetine (PAXIL) 20 MG Tab Take 20 mg by mouth.     • topiramate (TOPAMAX) 25 MG capsule Take 2 Capsules by mouth every bedtime. 180 capsule 3   • sumatriptan (IMITREX) 100 MG tablet Take 1/2  tablet po at onset of headache, may repeat dose in 2 hours if unrelieved.  Do not exceed more than 2 tablets in 24 hours. 9 tablet 11   • albuterol 108 (90 Base) MCG/ACT Aero Soln inhalation aerosol Inhale 2 Puffs every four hours as needed for Shortness of Breath. 8.5  "g 1   • enalapril (VASOTEC) 10 MG Tab Take 10 mg by mouth.  0   • pravastatin (PRAVACHOL) 10 MG Tab TAKE 1 TABLET BY MOUTH ONCE DAILY . APPOINTMENT REQUIRED FOR FUTURE REFILLS  3     No current facility-administered medications on file prior to visit.       Allergies: Patient has no known allergies.      ROS:   Review of Systems   Constitutional: Negative for chills, fever and weight loss.   HENT: Positive for nosebleeds. Negative for congestion, sinus pain and sore throat.    Eyes: Negative for pain and discharge.   Respiratory: Negative for cough, sputum production, shortness of breath, wheezing and stridor.    Cardiovascular: Negative for chest pain, orthopnea and leg swelling.   Gastrointestinal: Negative for abdominal pain, diarrhea, nausea and vomiting.   Genitourinary: Negative for dysuria, frequency and urgency.   Musculoskeletal: Negative for myalgias.   Skin: Negative for rash.   Neurological: Negative for dizziness, sensory change, focal weakness, loss of consciousness and headaches.   Psychiatric/Behavioral: Negative.    All other systems reviewed and are negative.    Vitals:  /66 (BP Location: Right arm, Patient Position: Sitting, BP Cuff Size: Adult)   Pulse 85   Ht 1.651 m (5' 5\")   Wt 86.8 kg (191 lb 6 oz)   SpO2 95%     Physical Exam:  Physical Exam  Vitals and nursing note reviewed.   Constitutional:       General: She is not in acute distress.     Appearance: Normal appearance. She is well-developed. She is not diaphoretic.   Eyes:      General: No scleral icterus.        Right eye: No discharge.         Left eye: No discharge.      Conjunctiva/sclera: Conjunctivae normal.      Pupils: Pupils are equal, round, and reactive to light.   Neck:      Thyroid: No thyromegaly.      Vascular: No JVD.   Cardiovascular:      Rate and Rhythm: Normal rate and regular rhythm.      Heart sounds: Normal heart sounds. No murmur. No gallop.    Pulmonary:      Effort: Pulmonary effort is normal. No " respiratory distress.      Breath sounds: Normal breath sounds. No wheezing or rales.   Abdominal:      General: There is no distension.      Palpations: Abdomen is soft.      Tenderness: There is no abdominal tenderness. There is no guarding.   Musculoskeletal:         General: No tenderness.   Lymphadenopathy:      Cervical: No cervical adenopathy.   Skin:     General: Skin is warm.      Capillary Refill: Capillary refill takes less than 2 seconds.      Findings: No erythema or rash.   Neurological:      Mental Status: She is alert and oriented to person, place, and time.      Cranial Nerves: No cranial nerve deficit.      Sensory: No sensory deficit.      Motor: No abnormal muscle tone.   Psychiatric:         Behavior: Behavior normal.       Laboratory Data:    PFTs as reviewed by me personally show:  3/2021 shows normal airflows on spirometry, significant BD response in FEV1, FVC and midflow rates.  Prebronchodilator FEV1 2.59 L, 87% predicted DLCO 125% predicted.    Imaging as reviewed by me personally show:    None    Assessment/Plan:    Problem List Items Addressed This Visit     Moderate persistent asthma without complication     Cont trelegy and singulair  Very well controlled  Refills provided  RTC 1 year         Relevant Medications    Fluticasone-Umeclidin-Vilant (TRELEGY ELLIPTA) 100-62.5-25 MCG/INH AEROSOL POWDER, BREATH ACTIVATED    montelukast (SINGULAIR) 10 MG Tab    Need for pneumococcal vaccination     PPSV23 today  COVID vacc 4/2021         Relevant Orders    Pneumococal Polysaccharide Vaccine 23-Valent =>1YO SQ/IM (Completed)        Return in about 1 year (around 4/22/2022).     This note was generated using voice recognition software which has a chance of producing errors of grammar and possibly content.  I have made every reasonable attempt to find and correct any obvious errors, but it should be expected that some may not be found prior to finalization of this note.  __________  Brendan  MD Jose  Pulmonary and Critical Care Medicine  [facility]

## 2021-06-22 ENCOUNTER — OFFICE VISIT (OUTPATIENT)
Dept: NEUROLOGY | Facility: MEDICAL CENTER | Age: 46
End: 2021-06-22
Attending: NURSE PRACTITIONER
Payer: COMMERCIAL

## 2021-06-22 VITALS
HEART RATE: 70 BPM | TEMPERATURE: 97 F | OXYGEN SATURATION: 95 % | BODY MASS INDEX: 31.59 KG/M2 | DIASTOLIC BLOOD PRESSURE: 78 MMHG | SYSTOLIC BLOOD PRESSURE: 114 MMHG | HEIGHT: 65 IN | WEIGHT: 189.6 LBS

## 2021-06-22 DIAGNOSIS — R55 SYNCOPE, UNSPECIFIED SYNCOPE TYPE: ICD-10-CM

## 2021-06-22 DIAGNOSIS — R42 VERTIGO: ICD-10-CM

## 2021-06-22 DIAGNOSIS — G43.109 MIGRAINE WITH AURA AND WITHOUT STATUS MIGRAINOSUS, NOT INTRACTABLE: ICD-10-CM

## 2021-06-22 PROCEDURE — 99214 OFFICE O/P EST MOD 30 MIN: CPT | Performed by: NURSE PRACTITIONER

## 2021-06-22 PROCEDURE — 99211 OFF/OP EST MAY X REQ PHY/QHP: CPT | Performed by: NURSE PRACTITIONER

## 2021-06-22 ASSESSMENT — ENCOUNTER SYMPTOMS
BACK PAIN: 0
COUGH: 0
DIZZINESS: 0
SENSORY CHANGE: 0
VOMITING: 0
TINGLING: 0
DEPRESSION: 0
HEARTBURN: 0
SINUS PAIN: 0
BLURRED VISION: 0
NECK PAIN: 0
NERVOUS/ANXIOUS: 0
NAUSEA: 0
FEVER: 0
SHORTNESS OF BREATH: 0
HEADACHES: 1

## 2021-06-22 NOTE — PROGRESS NOTES
Subjective:     MARTY  Jennifer Monge is a 46 y.o. female who presents for follow up for migraines.  I last saw her 3/16/2021.     She was referred by Dr. Yen Logan for increasing frequency of migraines, prior to January 2021, she had migraines about once per week,they  Then increased to 2-3 times weekly. .  She feels like menopause may be increasing her migraines.      Her migraines originally started at age 13,the onset was with grand mal seizures, she only had seizures for about 2 years, she doesn't remember if she took AED.     She had been having dizzy/virtiginous spells for a couple of months as well and had an episode in [place] or March 2021 ,where she passed out for less than 1 minute. She had been standing when she passed out. She feels like she had been well hydrated, had not had any changes in her routine. She had the vertigo for about 4 days after that, sometimes it had been  accompanied by nausea, no vomiting. If she laid down and stayed still, it went away, if she turned her head the vertigo returned.    Since last visit her migraines are less than once per week, she started on the supplements and topiramate.  She has not had subsequent vertiginous or syncopal episodes.  She has not scheduled MRI or EEG.  The increased dose of sumatriptan helped a lot with the migraines that she did have.             Age at Onset: 13, started with seizure, went away with first child, in the past 3 months they became more frequent  Triggers:  Unable to identify.  Alleviating factors: dark quiet room, sleeping.   Meds tried and result: Sumatriptan 25mg did not work, the 100mg sumatriptan works.  Topiramate and supplements decreased headache days by over 75%.  Hormones:  Estrogen cream, testosterone cream  Caffeine use:  1 cup of coffee daily.   OTC medications--frequency:  Excedrin migraine/advil 3-4 times per week.   How many days per month: < 1 per week  Missed days of school/work in past 6 months:   10    Quality:  Pain is bi-frontal, radiates to whole head, pulsating, pain 8/10, lasts about 6 hours.   N&V:  Both   Photo/phonophobia:  Both  Associated symptoms:  Blurred vision, numbness of hands, slurred speech.   Aura: loses in left eye, pain starts about 15 minutes after losing vision.  ER/Urgent care visits:  None            PMH:  HTN, depression, migraine, asthma  Social:  Never smoker, 3-4 drinks per month, denies drug use, she works as an  for the [employer].   Family Hx. Denies family  History of migraine.     Review of Systems   Constitutional: Negative for fever.   HENT: Negative for congestion and sinus pain.    Eyes: Negative for blurred vision.   Respiratory: Negative for cough and shortness of breath.    Cardiovascular: Negative for chest pain.   Gastrointestinal: Negative for heartburn, nausea and vomiting.   Musculoskeletal: Negative for back pain and neck pain.   Neurological: Positive for headaches. Negative for dizziness, tingling and sensory change.   Psychiatric/Behavioral: Negative for depression. The patient is not nervous/anxious.      Current Outpatient Medications on File Prior to Visit   Medication Sig Dispense Refill   • Fluticasone-Umeclidin-Vilant (TRELEGY ELLIPTA) 100-62.5-25 MCG/INH AEROSOL POWDER, BREATH ACTIVATED Inhale 1 Puff every day. 1 Each 11   • montelukast (SINGULAIR) 10 MG Tab Take 1 tablet by mouth at bedtime. Take 1 tablet by mouth nightly at bedtime. 90 tablet 11   • PARoxetine (PAXIL) 20 MG Tab Take 20 mg by mouth.     • topiramate (TOPAMAX) 25 MG capsule Take 2 Capsules by mouth every bedtime. 180 capsule 3   • sumatriptan (IMITREX) 100 MG tablet Take 1/2  tablet po at onset of headache, may repeat dose in 2 hours if unrelieved.  Do not exceed more than 2 tablets in 24 hours. 9 tablet 11   • albuterol 108 (90 Base) MCG/ACT Aero Soln inhalation aerosol Inhale 2 Puffs every four hours as needed for Shortness of Breath. 8.5 g 1   • enalapril (VASOTEC) 10 MG  "Tab Take 10 mg by mouth.  0   • pravastatin (PRAVACHOL) 10 MG Tab TAKE 1 TABLET BY MOUTH ONCE DAILY . APPOINTMENT REQUIRED FOR FUTURE REFILLS  3     No current facility-administered medications on file prior to visit.     Past Medical History:   Diagnosis Date   • ASTHMA    • Back pain    • Back pain    • Chest tightness    • Chickenpox    • Cough    • Depression    • Difficulty breathing    • Frequent headaches    • Hypertension    • Migraine    • Shortness of breath    • Snoring    • Sweat, sweating, excessive    • Wears glasses    • Wheezing           Objective:     Encounter Vitals  Standard Vitals  Vitals  Blood Pressure: 114/78  Temperature: 36.1 °C (97 °F)  Temp src: Temporal  Pulse: 70  Pulse Oximetry: 95 %  Height: 165.1 cm (5' 5\")  Weight: 86 kg (189 lb 9.5 oz)  Encounter Vitals  Temperature: 36.1 °C (97 °F)  Temp src: Temporal  Blood Pressure: 114/78  Pulse: 70  Pulse Oximetry: 95 %  Weight: 86 kg (189 lb 9.5 oz)  Height: 165.1 cm (5' 5\")  BMI (Calculated): 31.55      PHYSICAL EXAM  Constitutional:  Alert, no apparent distress,  Psych:   mood and affect WNL     Neuro:  Oriented X 4, speech fluent, naming and memory intact          Muskuloskeletal:  Moves all extremities equally, strength 5/5 bilaterally,          CN II: . Fundoscopic exam is normal with sharp discs and no vascular changes. Pupils are 3 mm and briskly reactive to light.          CN III, IV, VI  EOMs intact, no ptosis         CN V: Corneal responses are intact.         CN VII: Face is symmetric with normal eye closure and smile.         CN IX, X: Palate elevates symmetrically. Phonation is normal.         CN XI: Head turning and shoulder shrug are intact         CN XII: Tongue is midline with normal movements and no atrophy.                                       Ambulates with steady gait.                              Cardiovascular:    S1S2, no abnormal rhythm auscultated, no peripheral edema  Neck:                     No carotid bruits " noted   Pulmonary:            Respirations easy, lungs clear to auscultation all fields.     Skin:                     No obvious rashes.       Assessment/Plan:   1. Migraine with aura and without status migrainosus, not intractable     Continue topiramate 50mg every night, denies side effects.    Continue supplements as below.    Continue sumatriptan for rescue: 100mg Take 1 tablet po at onset of headache, may repeat dose in 2 hours if unrelieved.  Do not exceed more than 2 tablets in 24 hours. No more than 9 per months.     since symptoms have improved, the MRI is optional, she will decide if she wants to have it done.       I recommend the following over the counter supplements every night at bedtime:  magnesium oxide 400mg by mouth every night, may take extra dose if needed for headache (over the counter), hold for diarrhea   Riboflavin (Vitamin B2) 400mg by mouth every night (over the counter),may turn urine bright yellow   COQ 10, take 300mg every night. (over the counter)          Attempt to go to bed and get up at the same time every night           Eat meals on regular basis            Stay hydrated.             Aerobic exercise 30 minutes daily             Avoid aged or smoked foods, avoid processed foods, red wine, aged cheese              Keep headache diary, include foods that you may have eaten.             Avoid overusing over the counter medications:  Do not take more than 500mg acetaminophen (tylenol), more than 4 times weekly, more frequent or larger doses are associated with medication overuse headache.        2. Vertigo  Resolved.      3. Syncopal episode     No further episodes  Stay well hydrated. (at least 85 ounces of water daily)     My time includes reviewing the chart, obtaining current HPI, exam, discussion of disease process, ordering testing and medications and counseling.        I counseled patient on migraine triggers, lifestyle changes, medication overuse, supplements and medication  side effects.      \Follow up in 6 months.

## 2021-08-04 ENCOUNTER — HOSPITAL ENCOUNTER (OUTPATIENT)
Dept: LAB | Facility: MEDICAL CENTER | Age: 46
End: 2021-08-04
Attending: NURSE PRACTITIONER
Payer: COMMERCIAL

## 2021-08-04 LAB
ALBUMIN SERPL BCP-MCNC: 4.5 G/DL (ref 3.2–4.9)
ALBUMIN/GLOB SERPL: 1.6 G/DL
ALP SERPL-CCNC: 95 U/L (ref 30–99)
ALT SERPL-CCNC: 17 U/L (ref 2–50)
ANION GAP SERPL CALC-SCNC: 11 MMOL/L (ref 7–16)
AST SERPL-CCNC: 17 U/L (ref 12–45)
BASOPHILS # BLD AUTO: 1 % (ref 0–1.8)
BASOPHILS # BLD: 0.07 K/UL (ref 0–0.12)
BILIRUB SERPL-MCNC: 0.5 MG/DL (ref 0.1–1.5)
BUN SERPL-MCNC: 19 MG/DL (ref 8–22)
CALCIUM SERPL-MCNC: 9.4 MG/DL (ref 8.5–10.5)
CHLORIDE SERPL-SCNC: 106 MMOL/L (ref 96–112)
CHOLEST SERPL-MCNC: 150 MG/DL (ref 100–199)
CO2 SERPL-SCNC: 22 MMOL/L (ref 20–33)
CREAT SERPL-MCNC: 0.97 MG/DL (ref 0.5–1.4)
EOSINOPHIL # BLD AUTO: 0.23 K/UL (ref 0–0.51)
EOSINOPHIL NFR BLD: 3.2 % (ref 0–6.9)
ERYTHROCYTE [DISTWIDTH] IN BLOOD BY AUTOMATED COUNT: 46.8 FL (ref 35.9–50)
FASTING STATUS PATIENT QL REPORTED: NORMAL
GLOBULIN SER CALC-MCNC: 2.9 G/DL (ref 1.9–3.5)
GLUCOSE SERPL-MCNC: 88 MG/DL (ref 65–99)
HCT VFR BLD AUTO: 47.8 % (ref 37–47)
HDLC SERPL-MCNC: 43 MG/DL
HGB BLD-MCNC: 15.6 G/DL (ref 12–16)
IMM GRANULOCYTES # BLD AUTO: 0.03 K/UL (ref 0–0.11)
IMM GRANULOCYTES NFR BLD AUTO: 0.4 % (ref 0–0.9)
LDLC SERPL CALC-MCNC: 83 MG/DL
LYMPHOCYTES # BLD AUTO: 1.88 K/UL (ref 1–4.8)
LYMPHOCYTES NFR BLD: 26.1 % (ref 22–41)
MCH RBC QN AUTO: 31.8 PG (ref 27–33)
MCHC RBC AUTO-ENTMCNC: 32.6 G/DL (ref 33.6–35)
MCV RBC AUTO: 97.4 FL (ref 81.4–97.8)
MONOCYTES # BLD AUTO: 0.49 K/UL (ref 0–0.85)
MONOCYTES NFR BLD AUTO: 6.8 % (ref 0–13.4)
NEUTROPHILS # BLD AUTO: 4.51 K/UL (ref 2–7.15)
NEUTROPHILS NFR BLD: 62.5 % (ref 44–72)
NRBC # BLD AUTO: 0 K/UL
NRBC BLD-RTO: 0 /100 WBC
PLATELET # BLD AUTO: 402 K/UL (ref 164–446)
PMV BLD AUTO: 10 FL (ref 9–12.9)
POTASSIUM SERPL-SCNC: 4.8 MMOL/L (ref 3.6–5.5)
PROT SERPL-MCNC: 7.4 G/DL (ref 6–8.2)
RBC # BLD AUTO: 4.91 M/UL (ref 4.2–5.4)
SODIUM SERPL-SCNC: 139 MMOL/L (ref 135–145)
T4 FREE SERPL-MCNC: 1.08 NG/DL (ref 0.93–1.7)
TRIGL SERPL-MCNC: 120 MG/DL (ref 0–149)
TSH SERPL DL<=0.005 MIU/L-ACNC: 1.75 UIU/ML (ref 0.38–5.33)
WBC # BLD AUTO: 7.2 K/UL (ref 4.8–10.8)

## 2021-08-04 PROCEDURE — 80061 LIPID PANEL: CPT

## 2021-08-04 PROCEDURE — 84443 ASSAY THYROID STIM HORMONE: CPT

## 2021-08-04 PROCEDURE — 80053 COMPREHEN METABOLIC PANEL: CPT

## 2021-08-04 PROCEDURE — 84439 ASSAY OF FREE THYROXINE: CPT

## 2021-08-04 PROCEDURE — 85025 COMPLETE CBC W/AUTO DIFF WBC: CPT

## 2021-08-04 PROCEDURE — 36415 COLL VENOUS BLD VENIPUNCTURE: CPT

## 2021-11-07 ENCOUNTER — OFFICE VISIT (OUTPATIENT)
Dept: URGENT CARE | Facility: PHYSICIAN GROUP | Age: 46
End: 2021-11-07
Payer: COMMERCIAL

## 2021-11-07 VITALS
DIASTOLIC BLOOD PRESSURE: 72 MMHG | SYSTOLIC BLOOD PRESSURE: 122 MMHG | WEIGHT: 174 LBS | HEIGHT: 65 IN | OXYGEN SATURATION: 96 % | TEMPERATURE: 97.1 F | RESPIRATION RATE: 16 BRPM | HEART RATE: 123 BPM | BODY MASS INDEX: 28.99 KG/M2

## 2021-11-07 DIAGNOSIS — T78.40XA ALLERGIC REACTION, INITIAL ENCOUNTER: ICD-10-CM

## 2021-11-07 DIAGNOSIS — R21 RASH AND NONSPECIFIC SKIN ERUPTION: ICD-10-CM

## 2021-11-07 PROCEDURE — 99214 OFFICE O/P EST MOD 30 MIN: CPT | Performed by: PHYSICIAN ASSISTANT

## 2021-11-07 RX ORDER — METHYLPREDNISOLONE SODIUM SUCCINATE 125 MG/2ML
125 INJECTION, POWDER, LYOPHILIZED, FOR SOLUTION INTRAMUSCULAR; INTRAVENOUS ONCE
Status: COMPLETED | OUTPATIENT
Start: 2021-11-07 | End: 2021-11-07

## 2021-11-07 RX ORDER — METHYLPREDNISOLONE 4 MG/1
TABLET ORAL
Qty: 21 TABLET | Refills: 0 | Status: SHIPPED | OUTPATIENT
Start: 2021-11-07 | End: 2022-06-30

## 2021-11-07 RX ORDER — HYDROXYZINE HYDROCHLORIDE 25 MG/1
25 TABLET, FILM COATED ORAL 3 TIMES DAILY PRN
Qty: 30 TABLET | Refills: 0 | Status: SHIPPED | OUTPATIENT
Start: 2021-11-07

## 2021-11-07 RX ADMIN — METHYLPREDNISOLONE SODIUM SUCCINATE 125 MG: 125 INJECTION, POWDER, LYOPHILIZED, FOR SOLUTION INTRAMUSCULAR; INTRAVENOUS at 09:45

## 2021-11-07 ASSESSMENT — ENCOUNTER SYMPTOMS
FEVER: 0
RHINORRHEA: 0
ANOREXIA: 0
SORE THROAT: 0
MYALGIAS: 0
FATIGUE: 0
WHEEZING: 0
NAUSEA: 0
VOMITING: 0
CHILLS: 0
SHORTNESS OF BREATH: 0
ABDOMINAL PAIN: 0
DIARRHEA: 0
COUGH: 0
PALPITATIONS: 0

## 2021-11-07 ASSESSMENT — FIBROSIS 4 INDEX: FIB4 SCORE: 0.48

## 2021-11-07 NOTE — PROGRESS NOTES
Subjective     Jennifer Monge is a 46 y.o. female who presents with Rash (all over body after moderna shot yesterday )            Rash  This is a new problem. The current episode started yesterday (Patient received her COVID booster 4 days ago). The problem has been gradually worsening since onset. The rash is diffuse. The rash is characterized by itchiness and redness. Associated with: COVID booster vaccine. Pertinent negatives include no anorexia, congestion, cough, diarrhea, facial edema, fatigue, fever, rhinorrhea, shortness of breath, sore throat or vomiting. Past treatments include antihistamine (benadryl). The treatment provided no relief.     The patient denies fever or easy bruising or bleeding.    Past Medical History:   Diagnosis Date   • ASTHMA    • Back pain    • Back pain    • Chest tightness    • Chickenpox    • Cough    • Depression    • Difficulty breathing    • Frequent headaches    • Hypertension    • Migraine    • Shortness of breath    • Snoring    • Sweat, sweating, excessive    • Wears glasses    • Wheezing        Past Surgical History:   Procedure Laterality Date   • LASER ABLATION  2012   • SINUSCOPE     • TONSILLECTOMY      age 11   • TUBAL COAGULATION LAPAROSCOPIC BILATERAL         Family History   Problem Relation Age of Onset   • Breast Cancer Maternal Aunt    • Lung Cancer Paternal Grandmother        No Known Allergies    Medications, Allergies, and current problem list reviewed today in Epic  '    Review of Systems   Constitutional: Negative for chills, fatigue, fever and malaise/fatigue.   HENT: Negative for congestion, rhinorrhea and sore throat.    Respiratory: Negative for cough, shortness of breath and wheezing.    Cardiovascular: Negative for chest pain, palpitations and leg swelling.   Gastrointestinal: Negative for abdominal pain, anorexia, diarrhea, nausea and vomiting.   Musculoskeletal: Negative for myalgias.   Skin: Positive for itching and rash.         All other  "systems reviewed and are negative.         Objective     /72   Pulse (!) 123   Temp 36.2 °C (97.1 °F) (Temporal)   Resp 16   Ht 1.651 m (5' 5\")   Wt 78.9 kg (174 lb)   SpO2 96%   BMI 28.96 kg/m²      Physical Exam  Constitutional:       General: She is not in acute distress.     Appearance: She is not ill-appearing.   HENT:      Head: Normocephalic and atraumatic.   Eyes:      Conjunctiva/sclera: Conjunctivae normal.   Cardiovascular:      Rate and Rhythm: Normal rate and regular rhythm.   Pulmonary:      Effort: Pulmonary effort is normal. No respiratory distress.      Breath sounds: No wheezing.   Skin:     General: Skin is warm and dry.      Findings: Rash present. Rash is papular (diffuse).   Neurological:      General: No focal deficit present.      Mental Status: She is alert and oriented to person, place, and time.   Psychiatric:         Mood and Affect: Mood normal.         Behavior: Behavior normal.         Thought Content: Thought content normal.         Judgment: Judgment normal.                             Assessment & Plan        1. Allergic reaction, initial encounter  - likely due to the COVID booster vaccine  2. Rash and nonspecific skin eruption    - methylPREDNISolone sod succ (SOLU-MEDROL) 125 MG injection 125 mg  - methylPREDNISolone (MEDROL DOSEPAK) 4 MG Tablet Therapy Pack; Follow schedule on package instructions. Start Tomorrow.  Dispense: 21 Tablet; Refill: 0  - hydrOXYzine HCl (ATARAX) 25 MG Tab; Take 1 Tablet by mouth 3 times a day as needed for Itching.  Dispense: 30 Tablet; Refill: 0  Sedation side effects discussed. No Driving or alcohol with medication given.      Differential diagnoses, Supportive care, and indications for immediate follow-up discussed with patient.   Pathogenesis of diagnosis discussed including typical length and natural progression.   Instructed to return to clinic or nearest emergency department for any change in condition, further concerns, or " worsening of symptoms.    The patient demonstrated a good understanding and agreed with the treatment plan.    Nevaeh Ramirez P.A.-C.

## 2021-11-24 ENCOUNTER — HOSPITAL ENCOUNTER (OUTPATIENT)
Dept: LAB | Facility: MEDICAL CENTER | Age: 46
End: 2021-11-24
Attending: ANESTHESIOLOGY
Payer: COMMERCIAL

## 2021-11-24 LAB
ANION GAP SERPL CALC-SCNC: 12 MMOL/L (ref 7–16)
BUN SERPL-MCNC: 14 MG/DL (ref 8–22)
CALCIUM SERPL-MCNC: 9.6 MG/DL (ref 8.5–10.5)
CHLORIDE SERPL-SCNC: 102 MMOL/L (ref 96–112)
CO2 SERPL-SCNC: 22 MMOL/L (ref 20–33)
CREAT SERPL-MCNC: 0.86 MG/DL (ref 0.5–1.4)
FASTING STATUS PATIENT QL REPORTED: NORMAL
GLUCOSE SERPL-MCNC: 75 MG/DL (ref 65–99)
POTASSIUM SERPL-SCNC: 4.1 MMOL/L (ref 3.6–5.5)
SODIUM SERPL-SCNC: 136 MMOL/L (ref 135–145)

## 2021-11-24 PROCEDURE — 80048 BASIC METABOLIC PNL TOTAL CA: CPT

## 2021-11-24 PROCEDURE — 36415 COLL VENOUS BLD VENIPUNCTURE: CPT

## 2022-04-19 ENCOUNTER — HOSPITAL ENCOUNTER (OUTPATIENT)
Dept: RADIOLOGY | Facility: MEDICAL CENTER | Age: 47
End: 2022-04-19
Attending: OBSTETRICS & GYNECOLOGY
Payer: COMMERCIAL

## 2022-04-19 DIAGNOSIS — Z12.31 VISIT FOR SCREENING MAMMOGRAM: ICD-10-CM

## 2022-04-19 PROCEDURE — 77063 BREAST TOMOSYNTHESIS BI: CPT

## 2022-05-10 DIAGNOSIS — J45.40 MODERATE PERSISTENT ASTHMA WITHOUT COMPLICATION: ICD-10-CM

## 2022-05-10 DIAGNOSIS — G43.109 MIGRAINE WITH AURA AND WITHOUT STATUS MIGRAINOSUS, NOT INTRACTABLE: ICD-10-CM

## 2022-05-10 RX ORDER — TOPIRAMATE SPINKLE 25 MG/1
CAPSULE ORAL
Qty: 180 CAPSULE | Refills: 0 | Status: SHIPPED | OUTPATIENT
Start: 2022-05-10 | End: 2022-06-30

## 2022-05-10 RX ORDER — MONTELUKAST SODIUM 10 MG/1
TABLET ORAL
Qty: 90 TABLET | Refills: 0 | Status: SHIPPED | OUTPATIENT
Start: 2022-05-10 | End: 2022-09-14 | Stop reason: SDUPTHER

## 2022-05-10 NOTE — TELEPHONE ENCOUNTER
Have we ever prescribed this med? Yes.  If yes, what date? 4/22/21    Last OV: 04/22/21 with Dr Garcia    Next OV: No Pending appt. k    DX: Moderate persistent asthma without complication (J45.40)    Medications:   Requested Prescriptions     Pending Prescriptions Disp Refills   • montelukast (SINGULAIR) 10 MG Tab [Pharmacy Med Name: Montelukast Sodium 10 MG Oral Tablet] 90 Tablet 0     Sig: TAKE 1 TABLET BY MOUTH EVERY DAY AT BEDTIME

## 2022-05-29 DIAGNOSIS — J45.40 MODERATE PERSISTENT ASTHMA WITHOUT COMPLICATION: ICD-10-CM

## 2022-05-31 NOTE — TELEPHONE ENCOUNTER
Have we ever prescribed this med? Yes.  If yes, what date? 04/21/21    Last OV: 04/22/21 with Dr Garcia    Next OV: No Pending appt.     DX: Moderate persistent asthma without complication (J45.40)    Medications:   Requested Prescriptions     Pending Prescriptions Disp Refills   • TRELEGY ELLIPTA 100-62.5-25 MCG/INH AEROSOL POWDER, BREATH ACTIVATED inhalation [Pharmacy Med Name: Trelegy Ellipta 100-62.5-25 MCG/INH Inhalation Aerosol Powder Breath Activated] 60 Each 0     Sig: Inhale 1 puff by mouth once daily

## 2022-06-14 ENCOUNTER — APPOINTMENT (OUTPATIENT)
Dept: NEUROLOGY | Facility: MEDICAL CENTER | Age: 47
End: 2022-06-14
Attending: NURSE PRACTITIONER
Payer: COMMERCIAL

## 2022-06-30 ENCOUNTER — OFFICE VISIT (OUTPATIENT)
Dept: NEUROLOGY | Facility: MEDICAL CENTER | Age: 47
End: 2022-06-30
Attending: NURSE PRACTITIONER
Payer: COMMERCIAL

## 2022-06-30 VITALS
DIASTOLIC BLOOD PRESSURE: 68 MMHG | HEART RATE: 65 BPM | RESPIRATION RATE: 14 BRPM | SYSTOLIC BLOOD PRESSURE: 102 MMHG | OXYGEN SATURATION: 95 % | WEIGHT: 183.2 LBS | BODY MASS INDEX: 30.52 KG/M2 | TEMPERATURE: 97.3 F | HEIGHT: 65 IN

## 2022-06-30 DIAGNOSIS — G43.109 MIGRAINE WITH AURA AND WITHOUT STATUS MIGRAINOSUS, NOT INTRACTABLE: ICD-10-CM

## 2022-06-30 PROCEDURE — 99213 OFFICE O/P EST LOW 20 MIN: CPT | Performed by: NURSE PRACTITIONER

## 2022-06-30 PROCEDURE — 99211 OFF/OP EST MAY X REQ PHY/QHP: CPT | Performed by: NURSE PRACTITIONER

## 2022-06-30 RX ORDER — TOPIRAMATE 50 MG/1
50 TABLET, FILM COATED ORAL
Qty: 90 TABLET | Refills: 3 | Status: SHIPPED | OUTPATIENT
Start: 2022-06-30 | End: 2023-06-30

## 2022-06-30 RX ORDER — PRAVASTATIN SODIUM 40 MG
40 TABLET ORAL DAILY
COMMUNITY
Start: 2022-06-01

## 2022-06-30 RX ORDER — SUMATRIPTAN 100 MG/1
TABLET, FILM COATED ORAL
Qty: 9 TABLET | Refills: 11 | Status: SHIPPED | OUTPATIENT
Start: 2022-06-30

## 2022-06-30 ASSESSMENT — ENCOUNTER SYMPTOMS
VOMITING: 0
COUGH: 0
HEADACHES: 1
NERVOUS/ANXIOUS: 0
DEPRESSION: 0
BLURRED VISION: 0
DOUBLE VISION: 0
DIZZINESS: 0
NECK PAIN: 0
NAUSEA: 0

## 2022-06-30 ASSESSMENT — FIBROSIS 4 INDEX: FIB4 SCORE: 0.49

## 2022-06-30 ASSESSMENT — PATIENT HEALTH QUESTIONNAIRE - PHQ9: CLINICAL INTERPRETATION OF PHQ2 SCORE: 0

## 2022-06-30 NOTE — PROGRESS NOTES
Subjective     HPI  Jennifer Monge is a 47 y.o. female who presents for follow up for migraines.  I last saw her on 6/22/2021.      She was referred by Dr. Yen Logan for increasing frequency of migraines, prior to January 2021, she had migraines about once per week,they  Then increased to 2-3 times weekly. .  She feels like menopause may be increasing her migraines.      Her migraines originally started at age 13,the onset was with grand mal seizures, she only had seizures for about 2 years, she doesn't remember if she took AED.     Since last visit her migraines have improved.  She has not had a migraine in 1 month.  She started wearing a night-guard recommended by her dentist.           Age at Onset: 13, started with seizure, went away with first child, in the past 3 months they became more frequent  Triggers:  Unable to identify.  Alleviating factors: dark quiet room, sleeping.   Meds tried and result: Sumatriptan 25mg did not work, the 100mg sumatriptan works.  Topiramate and supplements decreased headache days by over 75%.  Hormones:  Estrogen cream, testosterone cream  Caffeine use:  1 cup of coffee daily.   OTC medications--frequency:  Excedrin migraine/advil 3-4 times per week.   How many days per month: prior to this past month they were about 1 time per week, now she has not had one in a month   Missed days of school/work in past 6 months:     Quality:  Pain is bi-frontal, radiates to whole head, pulsating, pain 8/10, lasts about 6 hours.   N&V:  Both 0  Photo/phonophobia:  Both  Associated symptoms:  Blurred vision, numbness of hands, slurred speech.   Aura: loses in left eye, pain starts about 15 minutes after losing vision.  ER/Urgent care visits:  None            PMH:  HTN, depression, migraine, asthma  Social:  Never smoker, 3-4 drinks per month, denies drug use, she works as an  for the [employer].   Family Hx. Denies family  History of migraine.       Review of Systems   Eyes:  "Negative for blurred vision and double vision.   Respiratory: Negative for cough.    Cardiovascular: Negative for chest pain.   Gastrointestinal: Negative for nausea and vomiting.   Musculoskeletal: Negative for neck pain.   Neurological: Positive for headaches. Negative for dizziness.   Psychiatric/Behavioral: Negative for depression. The patient is not nervous/anxious.          Current Outpatient Medications on File Prior to Visit   Medication Sig Dispense Refill   • pravastatin (PRAVACHOL) 40 MG tablet Take 40 mg by mouth every day.     • TRELEGY ELLIPTA 100-62.5-25 MCG/INH AEROSOL POWDER, BREATH ACTIVATED inhalation Inhale 1 puff by mouth once daily 60 Each 2   • montelukast (SINGULAIR) 10 MG Tab TAKE 1 TABLET BY MOUTH EVERY DAY AT BEDTIME 90 Tablet 0   • topiramate (TOPAMAX) 25 MG capsule TAKE 2 CAPSULES BY MOUTH EVERY DAY AT BEDTIME 180 Capsule 0   • hydrOXYzine HCl (ATARAX) 25 MG Tab Take 1 Tablet by mouth 3 times a day as needed for Itching. 30 Tablet 0   • meloxicam (MOBIC) 15 MG tablet Take 1 tablet by mouth every day. 30 tablet 0   • PARoxetine (PAXIL) 20 MG Tab Take 20 mg by mouth.     • sumatriptan (IMITREX) 100 MG tablet Take 1/2  tablet po at onset of headache, may repeat dose in 2 hours if unrelieved.  Do not exceed more than 2 tablets in 24 hours. 9 tablet 11   • albuterol 108 (90 Base) MCG/ACT Aero Soln inhalation aerosol Inhale 2 Puffs every four hours as needed for Shortness of Breath. 8.5 g 1   • enalapril (VASOTEC) 10 MG Tab Take 10 mg by mouth.  0     No current facility-administered medications on file prior to visit.         Objective      Encounter Vitals  Standard Vitals  Vitals  Blood Pressure: 102/68  Temperature: 36.3 °C (97.3 °F)  Temp src: Temporal  Pulse: 65  Respiration: 14  Pulse Oximetry: 95 %  Height: 165.1 cm (5' 5\")  Weight: 83.1 kg (183 lb 3.2 oz)  Encounter Vitals  Temperature: 36.3 °C (97.3 °F)  Temp src: Temporal  Blood Pressure: 102/68  Pulse: 65  Respiration: 14  Pulse " "Oximetry: 95 %  Weight: 83.1 kg (183 lb 3.2 oz)  Height: 165.1 cm (5' 5\")  BMI (Calculated): 30.49     PHYSICAL EXAM  Constitutional:  Alert, no apparent distress,  Psych:   mood and affect WNL     Neuro:  Oriented X 4, speech fluent, naming and memory intact          Muskuloskeletal:  Moves all extremities equally, strength 5/5 bilaterally,          CN II: . Fundoscopic exam is normal with sharp discs and no vascular changes. Pupils are 3 mm and briskly reactive to light.          CN III, IV, VI  EOMs intact, no ptosis         CN V: Corneal responses are intact.         CN VII: Face is symmetric with normal eye closure and smile.         CN IX, X: Palate elevates symmetrically. Phonation is normal.         CN XI: Head turning and shoulder shrug are intact         CN XII: Tongue is midline with normal movements and no atrophy.                                       Ambulates with steady gait.                              Cardiovascular:    S1S2, no abnormal rhythm auscultated, no peripheral edema  Neck:                     No carotid bruits noted   Pulmonary:            Respirations easy, lungs clear to auscultation all fields.     Skin:                     No obvious rashes.      Assessment & Plan     1. Migraine with aura and without status migrainosus, not intractable, improved symptoms since last visit.      Continue topiramate 50mg every night, denies side effects.     Continue supplements as below.     Continue sumatriptan for rescue: 100mg Take 1 tablet po at onset of headache, may repeat dose in 2 hours if unrelieved.  Do not exceed more than 2 tablets in 24 hours. No more than 9 per months.        Refilled sumatriptan and topiramate today for the whole year.          I counseled patient on migraine triggers, lifestyle changes, medication overuse, supplements and medication side effects.        \Follow up in 1 year.         "

## 2022-09-07 DIAGNOSIS — J45.40 MODERATE PERSISTENT ASTHMA WITHOUT COMPLICATION: ICD-10-CM

## 2022-09-07 NOTE — TELEPHONE ENCOUNTER
Have we ever prescribed this med? Yes.  If yes, what date? 4/22/2021    Last OV: 4/22/2021 CARMELITA Garcia MD     Next OV: No appt on file; pt was due back on 4/22/2022    DX: Moderate persistent asthma without complication (J45.40)    Medications: TRELEGY ELLIPTA 100-62.5-25 MCG/INH AEROSOL POWDER, BREATH ACTIVATED inhalation

## 2022-09-12 NOTE — PROGRESS NOTES
Pulmonary Clinic Note    Date of Visit: 9/14/2022     Chief Complaint:  Chief Complaint   Patient presents with    Asthma     Last seen 4/21/22 Dr. Garcia      HPI:   Jennifer Monge is a very pleasant 47 y.o. year old female never smoker, with a PMHx of asthma, migraines, HTN, DSL who presented to the Pulmonary Clinic for a regular follow up. Last seen in the office on 4/22/2021 with Dr. Garcia.     She is followed in pulmonary clinic for history of asthma, which she has had since age 9 but resolved as an adult.  2 years ago, the symptoms recurred.  Triggers include pollen, cats, cold air.  She has never been hospitalized for asthma.  PFT 3/2021 shows normal airflows on spirometry, significant BD response in FEV1, FVC and midflow rates.  Prebronchodilator FEV1 2.59 L, 87% predicted DLCO 125% predicted.  She was started on Trelegy and Singulair with albuterol as needed, which she will use may be once a week.  She was also counseled about sinus hygiene with saline nasal rinses.  Symptomatically, she denies any significant shortness of breath, cough, sputum production, or wheezing.  She does have some nasal congestion, which is sinus rinses do help with.  She has not had any exacerbations in the past year.    Exacerbations this year:  0    Current medication regimen: Trelegy, Singulair, and albuterol as needed    MMRC Grade: 0- Breathless only during strenuous exercise    Past Medical History:   Diagnosis Date    ASTHMA     Back pain     Back pain     Chest tightness     Chickenpox     Cough     Depression     Difficulty breathing     Frequent headaches     Hypertension     Migraine     Shortness of breath     Snoring     Sweat, sweating, excessive     Wears glasses     Wheezing      Past Surgical History:   Procedure Laterality Date    LASER ABLATION  2012    SINUSCOPE      TONSILLECTOMY      age 11    TUBAL COAGULATION LAPAROSCOPIC BILATERAL       Social History     Socioeconomic History    Marital status:       Spouse name: Not on file    Number of children: Not on file    Years of education: Not on file    Highest education level: Not on file   Occupational History    Not on file   Tobacco Use    Smoking status: Never    Smokeless tobacco: Never   Vaping Use    Vaping Use: Never used   Substance and Sexual Activity    Alcohol use: No    Drug use: No    Sexual activity: Yes     Partners: Male   Other Topics Concern    Not on file   Social History Narrative    Not on file     Social Determinants of Health     Financial Resource Strain: Not on file   Food Insecurity: Not on file   Transportation Needs: Not on file   Physical Activity: Not on file   Stress: Not on file   Social Connections: Not on file   Intimate Partner Violence: Not on file   Housing Stability: Not on file        Family History   Problem Relation Age of Onset    Breast Cancer Maternal Aunt     Lung Cancer Paternal Grandmother      Current Outpatient Medications on File Prior to Visit   Medication Sig Dispense Refill    pravastatin (PRAVACHOL) 40 MG tablet Take 40 mg by mouth every day.      sumatriptan (IMITREX) 100 MG tablet Take 1/2  tablet po at onset of headache, may repeat dose in 2 hours if unrelieved.  Do not exceed more than 2 tablets in 24 hours. 9 Tablet 11    PARoxetine (PAXIL) 20 MG Tab Take 20 mg by mouth.      albuterol 108 (90 Base) MCG/ACT Aero Soln inhalation aerosol Inhale 2 Puffs every four hours as needed for Shortness of Breath. 8.5 g 1    enalapril (VASOTEC) 10 MG Tab Take 10 mg by mouth.  0    topiramate (TOPAMAX) 50 MG tablet Take 1 Tablet by mouth at bedtime. 90 Tablet 3    hydrOXYzine HCl (ATARAX) 25 MG Tab Take 1 Tablet by mouth 3 times a day as needed for Itching. 30 Tablet 0    meloxicam (MOBIC) 15 MG tablet Take 1 tablet by mouth every day. 30 tablet 0     No current facility-administered medications on file prior to visit.     Allergies: Patient has no known allergies.    ROS:   Review of Systems   Constitutional:   "Negative for chills, diaphoresis, fever and malaise/fatigue.   HENT:  Negative for congestion and sinus pain.    Respiratory:  Negative for cough, hemoptysis, sputum production, shortness of breath and wheezing.    Cardiovascular:  Negative for chest pain, palpitations and leg swelling.   Gastrointestinal:  Negative for diarrhea, heartburn, nausea and vomiting.   Musculoskeletal:  Negative for falls and myalgias.   Neurological:  Negative for dizziness, weakness and headaches.     Vitals:  /76 (BP Location: Right arm, Patient Position: Sitting, BP Cuff Size: Adult)   Pulse 87   Resp 16   Ht 1.651 m (5' 5\")   Wt 80.3 kg (177 lb)   SpO2 97%     Physical Exam:  Physical Exam  Constitutional:       General: She is not in acute distress.     Appearance: Normal appearance. She is not ill-appearing, toxic-appearing or diaphoretic.   Cardiovascular:      Rate and Rhythm: Normal rate and regular rhythm.      Heart sounds: No murmur heard.    No friction rub. No gallop.   Pulmonary:      Effort: No respiratory distress.      Breath sounds: Normal breath sounds. No stridor. No wheezing, rhonchi or rales.   Musculoskeletal:         General: No swelling.      Right lower leg: No edema.      Left lower leg: No edema.   Skin:     General: Skin is warm.   Neurological:      General: No focal deficit present.      Mental Status: She is alert and oriented to person, place, and time.   Psychiatric:         Mood and Affect: Mood normal.         Behavior: Behavior normal.         Thought Content: Thought content normal.         Judgment: Judgment normal.       Laboratory Data:  PFTs (Date: 3/17/2021)-      Impression:  1.  Baseline spirometry shows normal airflows.  2.  There is significant bronchodilator response with improvement in both FEV1 and FVC.  3.  Lung volumes are within normal limits.  4.  DLCO is mildly elevated at 125% predicted.  Normal pulmonary function testing with only mild concavity to expiratory flow " volume loop positive bronchodilator response which may be consistent with a diagnosis of reactive airways disease.  Suggest clinical correlation.      Assessment and Plan:    Problem List Items Addressed This Visit       Moderate persistent asthma without complication     PFTs in 2021 showed aFEV1 of 2.59 L or 87%, FEV1/FVC 75%, %, DLCO 125% predicted.  Patient is currently on Trelegy 100, Singulair, and albuterol as needed, which she needs maybe once a week.  She has not had any exacerbations in the last year.  Symptomatically she denies any significant shortness of breath, cough, sputum production, or wheezing.  She states that she has overall not had any asthma symptoms on Trelegy.  -- Continue Trelegy 100  -- Continue albuterol as needed  -- Encourage patient to remain active  -- Patient is stable, there is no need to repeat PFTs at this time  -- Encourage patient remain fully vaccinated against COVID, influenza, pneumococcal.  Patient is eligible for pneumococcal and influenza vaccine, which she is amenable to receiving today.  Patient is also due for her COVID booster shot, which she has been advised to wait for the new COVID booster shot to be available.         Relevant Medications    montelukast (SINGULAIR) 10 MG Tab    Fluticasone-Umeclidin-Vilant (TRELEGY ELLIPTA) 100-62.5-25 MCG/INH AEROSOL POWDER, BREATH ACTIVATED inhalation    Need for vaccination    Relevant Orders    INFLUENZA VACCINE QUAD INJ (PF)    Pneumococcal Conjugate Vaccine 20-Valent (19 yrs+)       Diagnostic studies have been reviewed with the patient.    Return in about 1 year (around 9/14/2023), or if symptoms worsen or fail to improve.     This note was generated using voice recognition software which has a chance of producing errors of grammar and possibly content.  I have made every reasonable attempt to find and correct any obvious errors, but it should be expected that some may not be found prior to finalization of this  note.    Time spent in record review prior to patient arrival, reviewing results, and in face-to-face encounter totaled 29 min.  __________  ANISHA Miller  Pulmonary Medicine  [facility]

## 2022-09-14 ENCOUNTER — OFFICE VISIT (OUTPATIENT)
Dept: SLEEP MEDICINE | Facility: MEDICAL CENTER | Age: 47
End: 2022-09-14
Payer: COMMERCIAL

## 2022-09-14 VITALS
OXYGEN SATURATION: 97 % | SYSTOLIC BLOOD PRESSURE: 128 MMHG | BODY MASS INDEX: 29.49 KG/M2 | HEART RATE: 87 BPM | RESPIRATION RATE: 16 BRPM | HEIGHT: 65 IN | WEIGHT: 177 LBS | DIASTOLIC BLOOD PRESSURE: 76 MMHG

## 2022-09-14 DIAGNOSIS — J45.40 MODERATE PERSISTENT ASTHMA WITHOUT COMPLICATION: ICD-10-CM

## 2022-09-14 DIAGNOSIS — Z23 NEED FOR VACCINATION: ICD-10-CM

## 2022-09-14 PROCEDURE — 90686 IIV4 VACC NO PRSV 0.5 ML IM: CPT

## 2022-09-14 PROCEDURE — 99213 OFFICE O/P EST LOW 20 MIN: CPT | Mod: 25

## 2022-09-14 PROCEDURE — 90471 IMMUNIZATION ADMIN: CPT

## 2022-09-14 PROCEDURE — 90677 PCV20 VACCINE IM: CPT

## 2022-09-14 PROCEDURE — 90472 IMMUNIZATION ADMIN EACH ADD: CPT

## 2022-09-14 RX ORDER — MONTELUKAST SODIUM 10 MG/1
10 TABLET ORAL
Qty: 90 TABLET | Refills: 3 | Status: SHIPPED | OUTPATIENT
Start: 2022-09-14

## 2022-09-14 ASSESSMENT — ENCOUNTER SYMPTOMS
SHORTNESS OF BREATH: 0
NAUSEA: 0
WEAKNESS: 0
DIARRHEA: 0
HEADACHES: 0
VOMITING: 0
MYALGIAS: 0
HEARTBURN: 0
SPUTUM PRODUCTION: 0
DIZZINESS: 0
SINUS PAIN: 0
PALPITATIONS: 0
WHEEZING: 0
HEMOPTYSIS: 0
FALLS: 0
CHILLS: 0
DIAPHORESIS: 0
FEVER: 0
COUGH: 0

## 2022-09-14 ASSESSMENT — FIBROSIS 4 INDEX: FIB4 SCORE: 0.49

## 2022-09-14 NOTE — ASSESSMENT & PLAN NOTE
PFTs in 2021 showed aFEV1 of 2.59 L or 87%, FEV1/FVC 75%, %, DLCO 125% predicted.  Patient is currently on Trelegy 100, Singulair, and albuterol as needed, which she needs maybe once a week.  She has not had any exacerbations in the last year.  Symptomatically she denies any significant shortness of breath, cough, sputum production, or wheezing.  She states that she has overall not had any asthma symptoms on Trelegy.  -- Continue Trelegy 100  -- Continue albuterol as needed  -- Encourage patient to remain active  -- Patient is stable, there is no need to repeat PFTs at this time  -- Encourage patient remain fully vaccinated against COVID, influenza, pneumococcal.  Patient is eligible for pneumococcal and influenza vaccine, which she is amenable to receiving today.  Patient is also due for her COVID booster shot, which she has been advised to wait for the new COVID booster shot to be available.

## 2023-06-26 ENCOUNTER — HOSPITAL ENCOUNTER (OUTPATIENT)
Facility: MEDICAL CENTER | Age: 48
End: 2023-06-26
Attending: NURSE PRACTITIONER
Payer: COMMERCIAL

## 2023-06-26 ENCOUNTER — OFFICE VISIT (OUTPATIENT)
Dept: URGENT CARE | Facility: PHYSICIAN GROUP | Age: 48
End: 2023-06-26
Payer: COMMERCIAL

## 2023-06-26 VITALS
OXYGEN SATURATION: 98 % | TEMPERATURE: 97 F | DIASTOLIC BLOOD PRESSURE: 80 MMHG | HEIGHT: 65 IN | HEART RATE: 88 BPM | BODY MASS INDEX: 28.82 KG/M2 | SYSTOLIC BLOOD PRESSURE: 116 MMHG | WEIGHT: 173 LBS

## 2023-06-26 DIAGNOSIS — R35.0 URINARY FREQUENCY: ICD-10-CM

## 2023-06-26 DIAGNOSIS — R39.15 URINARY URGENCY: ICD-10-CM

## 2023-06-26 DIAGNOSIS — N30.01 ACUTE CYSTITIS WITH HEMATURIA: ICD-10-CM

## 2023-06-26 LAB
APPEARANCE UR: NORMAL
BILIRUB UR STRIP-MCNC: NEGATIVE MG/DL
COLOR UR AUTO: NORMAL
GLUCOSE UR STRIP.AUTO-MCNC: NORMAL MG/DL
KETONES UR STRIP.AUTO-MCNC: NEGATIVE MG/DL
LEUKOCYTE ESTERASE UR QL STRIP.AUTO: NORMAL
NITRITE UR QL STRIP.AUTO: NORMAL
PH UR STRIP.AUTO: 7 [PH] (ref 5–8)
PROT UR QL STRIP: NORMAL MG/DL
RBC UR QL AUTO: NORMAL
SP GR UR STRIP.AUTO: 1.02
UROBILINOGEN UR STRIP-MCNC: NORMAL MG/DL

## 2023-06-26 PROCEDURE — 99213 OFFICE O/P EST LOW 20 MIN: CPT | Performed by: NURSE PRACTITIONER

## 2023-06-26 PROCEDURE — 87186 SC STD MICRODIL/AGAR DIL: CPT

## 2023-06-26 PROCEDURE — 3079F DIAST BP 80-89 MM HG: CPT | Performed by: NURSE PRACTITIONER

## 2023-06-26 PROCEDURE — 3074F SYST BP LT 130 MM HG: CPT | Performed by: NURSE PRACTITIONER

## 2023-06-26 PROCEDURE — 81002 URINALYSIS NONAUTO W/O SCOPE: CPT | Performed by: NURSE PRACTITIONER

## 2023-06-26 PROCEDURE — 87077 CULTURE AEROBIC IDENTIFY: CPT

## 2023-06-26 PROCEDURE — 87086 URINE CULTURE/COLONY COUNT: CPT

## 2023-06-26 RX ORDER — PHENAZOPYRIDINE HYDROCHLORIDE 200 MG/1
200 TABLET, FILM COATED ORAL 3 TIMES DAILY
Qty: 6 TABLET | Refills: 0 | Status: SHIPPED | OUTPATIENT
Start: 2023-06-26 | End: 2023-06-28

## 2023-06-26 RX ORDER — NITROFURANTOIN 25; 75 MG/1; MG/1
100 CAPSULE ORAL EVERY 12 HOURS
Qty: 10 CAPSULE | Refills: 0 | Status: SHIPPED | OUTPATIENT
Start: 2023-06-26 | End: 2023-07-01

## 2023-06-26 RX ORDER — FLUCONAZOLE 100 MG/1
TABLET ORAL
COMMUNITY
Start: 2023-04-28 | End: 2023-06-26

## 2023-06-26 RX ORDER — TOPIRAMATE SPINKLE 25 MG/1
25 CAPSULE ORAL DAILY
COMMUNITY
Start: 2023-06-02

## 2023-06-26 ASSESSMENT — FIBROSIS 4 INDEX: FIB4 SCORE: 0.5

## 2023-06-26 NOTE — PROGRESS NOTES
Jennifer Monge is a 48 y.o. female who presents for UTI (X1day frequency, urgency. )      HPI  This is a new problem. Jennifer Monge is a 48 y.o. patient who presents to urgent care with c/o: Yesterday morning started having urinary symptoms frequency and burning. Feels like she has UTI.   Treatments tried: cipro 1 tablet.   Denies fever, chills, flank pain    No other aggravating or alleviating factors.       ROS See HPI    Allergies:     No Known Allergies    PMSFS Hx:  Past Medical History:   Diagnosis Date    ASTHMA     Back pain     Back pain     Chest tightness     Chickenpox     Cough     Depression     Difficulty breathing     Frequent headaches     Hypertension     Migraine     Shortness of breath     Snoring     Sweat, sweating, excessive     Wears glasses     Wheezing      Past Surgical History:   Procedure Laterality Date    LASER ABLATION  2012    SINUSCOPE      TONSILLECTOMY      age 11    TUBAL COAGULATION LAPAROSCOPIC BILATERAL       Family History   Problem Relation Age of Onset    Breast Cancer Maternal Aunt     Lung Cancer Paternal Grandmother      Social History     Tobacco Use    Smoking status: Never    Smokeless tobacco: Never   Vaping Use    Vaping Use: Never used   Substance Use Topics    Alcohol use: No       Problems:   Patient Active Problem List   Diagnosis    HTN (hypertension)    Migraine with aura and without status migrainosus, not intractable    Depression    Vertigo    Syncope    Moderate persistent asthma without complication    Need for pneumococcal vaccination    Need for vaccination       Medications:   Current Outpatient Medications on File Prior to Visit   Medication Sig Dispense Refill    TOBRADEX 0.3-0.1 % Ointment       topiramate (TOPAMAX) 25 MG capsule Take 25 mg by mouth every day.      montelukast (SINGULAIR) 10 MG Tab Take 1 Tablet by mouth at bedtime. 90 Tablet 3    Fluticasone-Umeclidin-Vilant (TRELEGY ELLIPTA) 100-62.5-25 MCG/INH AEROSOL POWDER, BREATH  "ACTIVATED inhalation Inhale 1 Inhalation every day. 3 Each 3    pravastatin (PRAVACHOL) 40 MG tablet Take 40 mg by mouth every day.      topiramate (TOPAMAX) 50 MG tablet Take 1 Tablet by mouth at bedtime. 90 Tablet 3    sumatriptan (IMITREX) 100 MG tablet Take 1/2  tablet po at onset of headache, may repeat dose in 2 hours if unrelieved.  Do not exceed more than 2 tablets in 24 hours. 9 Tablet 11    hydrOXYzine HCl (ATARAX) 25 MG Tab Take 1 Tablet by mouth 3 times a day as needed for Itching. 30 Tablet 0    meloxicam (MOBIC) 15 MG tablet Take 1 tablet by mouth every day. 30 tablet 0    PARoxetine (PAXIL) 20 MG Tab Take 20 mg by mouth.      albuterol 108 (90 Base) MCG/ACT Aero Soln inhalation aerosol Inhale 2 Puffs every four hours as needed for Shortness of Breath. 8.5 g 1    enalapril (VASOTEC) 10 MG Tab Take 10 mg by mouth.  0     No current facility-administered medications on file prior to visit.          Objective:     /80   Pulse 88   Temp 36.1 °C (97 °F) (Temporal)   Ht 1.651 m (5' 5\")   Wt 78.5 kg (173 lb)   SpO2 98%   BMI 28.79 kg/m²     Physical Exam  Vitals and nursing note reviewed.   Constitutional:       General: She is not in acute distress.     Appearance: Normal appearance. She is well-developed. She is not ill-appearing or toxic-appearing.   HENT:      Head: Normocephalic.   Cardiovascular:      Rate and Rhythm: Normal rate.      Pulses: Normal pulses.   Pulmonary:      Effort: Pulmonary effort is normal.   Abdominal:      Palpations: Abdomen is soft. Abdomen is not rigid.      Tenderness: There is no right CVA tenderness or left CVA tenderness.   Musculoskeletal:      Lumbar back: Normal.   Neurological:      Mental Status: She is alert and oriented to person, place, and time.   Psychiatric:         Mood and Affect: Mood normal.         Behavior: Behavior normal. Behavior is cooperative.     Results for orders placed or performed in visit on 06/26/23   POCT Urinalysis   Result Value " Ref Range    POC Color DARK YELLOW Negative    POC Appearance SLIGHTLY CLOUDY Negative    POC Glucose 100MG/DL Negative mg/dL    POC Bilirubin NEGATIVE Negative mg/dL    POC Ketones NEGATIVE Negative mg/dL    POC Specific Gravity 1.020 <1.005 - >1.030    POC Blood MODERATE** Negative    POC Urine PH 7.0 5.0 - 8.0    POC Protein 1.0E.U Negative mg/dL    POC Urobiligen POSITIVE** Negative (0.2) mg/dL    POC Nitrites POSITVE** Negative    POC Leukocyte Esterase LARGE** Negative         Assessment /Associated Orders:      1. Urinary frequency  POCT Urinalysis    phenazopyridine (PYRIDIUM) 200 MG Tab      2. Acute cystitis with hematuria  nitrofurantoin (MACROBID) 100 MG Cap    Urine Culture      3. Urinary urgency  phenazopyridine (PYRIDIUM) 200 MG Tab          Medical Decision Making:    Pt is clinically stable at today's acute urgent care visit.  No acute distress noted. Appropriate for outpatient care at this time.   Acute problem today with uncertain prognosis.   Educated in proper administration of  prescription medication(s) ordered today including safety, possible SE, risks, benefits, rationale and alternatives to therapy.   Urine culture pending   Keep well hydrated      Discussed Dx, management options (risks,benefits, and alternatives to planned treatment), natural progression and supportive care.  Expressed understanding and the treatment plan was agreed upon.   Questions were encouraged and answered   Return to urgent care prn if new or worsening sx or if there is no improvement in condition prn.    Educated in Red flags and indications to immediately call 911 or present to the Emergency Department.             Please note that this dictation was created using voice recognition software. I have worked with consultants from the vendor as well as technical experts from [employer] to optimize the interface. I have made every reasonable attempt to correct obvious errors, but I expect that there are errors of  grammar and possibly content that I did not discover before finalizing the note.  This note was electronically signed by provider

## 2023-06-26 NOTE — PATIENT INSTRUCTIONS
Urinary Tract Infection, Adult  A urinary tract infection (UTI) is an infection of any part of the urinary tract. The urinary tract includes:  The kidneys.  The ureters.  The bladder.  The urethra.  These organs make, store, and get rid of pee (urine) in the body.  What are the causes?  This is caused by germs (bacteria) in your genital area. These germs grow and cause swelling (inflammation) of your urinary tract.  What increases the risk?  You are more likely to develop this condition if:  You have a small, thin tube (catheter) to drain pee.  You cannot control when you pee or poop (incontinence).  You are female, and:  You use these methods to prevent pregnancy:  A medicine that kills sperm (spermicide).  A device that blocks sperm (diaphragm).  You have low levels of a female hormone (estrogen).  You are pregnant.  You have genes that add to your risk.  You are sexually active.  You take antibiotic medicines.  You have trouble peeing because of:  A prostate that is bigger than normal, if you are male.  A blockage in the part of your body that drains pee from the bladder (urethra).  A kidney stone.  A nerve condition that affects your bladder (neurogenic bladder).  Not getting enough to drink.  Not peeing often enough.  You have other conditions, such as:  Diabetes.  A weak disease-fighting system (immune system).  Sickle cell disease.  Gout.  Injury of the spine.  What are the signs or symptoms?  Symptoms of this condition include:  Needing to pee right away (urgently).  Peeing often.  Peeing small amounts often.  Pain or burning when peeing.  Blood in the pee.  Pee that smells bad or not like normal.  Trouble peeing.  Pee that is cloudy.  Fluid coming from the vagina, if you are female.  Pain in the belly or lower back.  Other symptoms include:  Throwing up (vomiting).  No urge to eat.  Feeling mixed up (confused).  Being tired and grouchy (irritable).  A fever.  Watery poop (diarrhea).  How is this  treated?  This condition may be treated with:  Antibiotic medicine.  Other medicines.  Drinking enough water.  Follow these instructions at home:    Medicines  Take over-the-counter and prescription medicines only as told by your doctor.  If you were prescribed an antibiotic medicine, take it as told by your doctor. Do not stop taking it even if you start to feel better.  General instructions  Make sure you:  Pee until your bladder is empty.  Do not hold pee for a long time.  Empty your bladder after sex.  Wipe from front to back after pooping if you are a female. Use each tissue one time when you wipe.  Drink enough fluid to keep your pee pale yellow.  Keep all follow-up visits as told by your doctor. This is important.  Contact a doctor if:  You do not get better after 1-2 days.  Your symptoms go away and then come back.  Get help right away if:  You have very bad back pain.  You have very bad pain in your lower belly.  You have a fever.  You are sick to your stomach (nauseous).  You are throwing up.  Summary  A urinary tract infection (UTI) is an infection of any part of the urinary tract.  This condition is caused by germs in your genital area.  There are many risk factors for a UTI. These include having a small, thin tube to drain pee and not being able to control when you pee or poop.  Treatment includes antibiotic medicines for germs.  Drink enough fluid to keep your pee pale yellow.  This information is not intended to replace advice given to you by your health care provider. Make sure you discuss any questions you have with your health care provider.  Document Released: 06/05/2009 Document Revised: 12/05/2019 Document Reviewed: 06/27/2019  [employer] Patient Education © 2020 [employer]

## 2023-06-28 LAB
BACTERIA UR CULT: ABNORMAL
BACTERIA UR CULT: ABNORMAL
SIGNIFICANT IND 70042: ABNORMAL
SITE SITE: ABNORMAL
SOURCE SOURCE: ABNORMAL

## 2024-01-10 ENCOUNTER — APPOINTMENT (OUTPATIENT)
Dept: SLEEP MEDICINE | Facility: MEDICAL CENTER | Age: 49
End: 2024-01-10
Attending: INTERNAL MEDICINE
Payer: COMMERCIAL

## 2024-01-24 ENCOUNTER — APPOINTMENT (OUTPATIENT)
Dept: SLEEP MEDICINE | Facility: MEDICAL CENTER | Age: 49
End: 2024-01-24
Attending: INTERNAL MEDICINE
Payer: COMMERCIAL

## 2024-03-26 ENCOUNTER — APPOINTMENT (OUTPATIENT)
Dept: NEUROLOGY | Facility: MEDICAL CENTER | Age: 49
End: 2024-03-26
Attending: PSYCHIATRY & NEUROLOGY
Payer: COMMERCIAL

## 2024-04-03 ENCOUNTER — HOSPITAL ENCOUNTER (OUTPATIENT)
Dept: RADIOLOGY | Facility: MEDICAL CENTER | Age: 49
End: 2024-04-03
Attending: OBSTETRICS & GYNECOLOGY
Payer: COMMERCIAL

## 2024-04-03 DIAGNOSIS — Z12.31 ENCOUNTER FOR SCREENING MAMMOGRAM FOR BREAST CANCER: ICD-10-CM

## 2024-04-03 PROCEDURE — 77067 SCR MAMMO BI INCL CAD: CPT

## 2024-04-16 NOTE — PROGRESS NOTES
"[facility] NEUROLOGY  GENERAL NEUROLOGY  NEW PATIENT VISIT      HISTORY OF ILLNESS:  Jennifer Monge is a 48 y.o. woman with a history most notable for migraine. She is a former patient of Stacey LANCASTER. She was last seen 6/30/22, at that time she was on Topamax 50 mg and sumatriptan 100 mg.  Today, Jennifer provided the following history:    Migraine description:  Starts with a shadowy aura in right then loses sight in right eye, itchy chin, an tingling in bilateral hands. She reports 10 minutes before migraine starts. Migraine starts in the bilateral frontal region. Migraine will radiate to whole head down neck and sometimes into shoulders. Quality of migraine is described as a sharp pain \"knife like\". Intensity of migraine without medications 8/10. With sumatriptan 0-4/10, Excedrin migraine 2 tablets 6/10. Migraines last for 4 hours to 12 hours. Frequency of migraines is inconsistent sometimes months apart sometimes a cluster every other day. Associated symptoms: light sensitivity, sound sensitivity, occasional nausea and vomiting, visual disturbance, tingling in bilateral hands. Triggers: stress, sleep deprivation, dehydration, skipping meals. Denies being able to trigger migraine with sudden position change, bending, position change, sneezing, coughing, laughing, or crying. Denies double vision. Migraines occur more frequently in the morning.     The following is a summary of headache symptoms, presented in my standard format:    Family History: none  Age at onset (years): 13 years old  Location: see above   Radiation: see above   Frequency: see above   Duration: see above   Headache Days/Month: January 0 /30, February 0/29, March 0/30, April 5/17  Quality: see above   Intensity: see above   Aura: see above   Photophobia/Phonophobia/Nausea/Vomiting: yes, yes, yes, yes  Provoked by Physical Activity?: no  Triggers: see above   Associated Symptoms: see above   Autonomic Signs (such as ptosis, miosis, conjunctival " injection, rhinorrhea, increased lacrimation): no  Head Trauma: hit head with seizure as teenager  Association with Menses: no  ED Visits: in teens  Hospitalizations: no  Missed Work Days ([employer]): infrequent   Sleep (hours/night): 6-8 hour  Caffeine Intake: 1 cup coffee  Hydration: yes  Nutrition: does not skip meals   Exercise: yes- does not trigger migraines  Analgesic Overuse: Excedrin migraine 2 tablets as needed     Current Medication Regimen:  - Sumatriptan  100  - Topamax 50    Medications Tried: Response  Preventive:  -     Rescue:  -     Medications Not Tried:  -     MEDICAL AND SURGICAL HISTORY:  Past Medical History:   Diagnosis Date    ASTHMA     Back pain     Back pain     Chest tightness     Chickenpox     Cough     Depression     Difficulty breathing     Frequent headaches     Hypertension     Migraine     Shortness of breath     Snoring     Sweat, sweating, excessive     Wears glasses     Wheezing      Past Surgical History:   Procedure Laterality Date    LASER ABLATION  2012    SINUSCOPE      TONSILLECTOMY      age 11    TUBAL COAGULATION LAPAROSCOPIC BILATERAL       MEDICATIONS:  Current Outpatient Medications   Medication Sig    TOBRADEX 0.3-0.1 % Ointment     topiramate (TOPAMAX) 25 MG capsule Take 25 mg by mouth every day.    montelukast (SINGULAIR) 10 MG Tab Take 1 Tablet by mouth at bedtime.    Fluticasone-Umeclidin-Vilant (TRELEGY ELLIPTA) 100-62.5-25 MCG/INH AEROSOL POWDER, BREATH ACTIVATED inhalation Inhale 1 Inhalation every day.    pravastatin (PRAVACHOL) 40 MG tablet Take 40 mg by mouth every day.    sumatriptan (IMITREX) 100 MG tablet Take 1/2  tablet po at onset of headache, may repeat dose in 2 hours if unrelieved.  Do not exceed more than 2 tablets in 24 hours.    hydrOXYzine HCl (ATARAX) 25 MG Tab Take 1 Tablet by mouth 3 times a day as needed for Itching.    meloxicam (MOBIC) 15 MG tablet Take 1 tablet by mouth every day.    PARoxetine (PAXIL) 20 MG Tab Take 20 mg by  "mouth.    albuterol 108 (90 Base) MCG/ACT Aero Soln inhalation aerosol Inhale 2 Puffs every four hours as needed for Shortness of Breath.    enalapril (VASOTEC) 10 MG Tab Take 10 mg by mouth.     SOCIAL HISTORY:  Social History     Tobacco Use    Smoking status: Never    Smokeless tobacco: Never   Substance Use Topics    Alcohol use: No     Social History     Social History Narrative    Not on file     FAMILY HISTORY:  Family History   Problem Relation Age of Onset    Breast Cancer Maternal Aunt     Lung Cancer Paternal Grandmother        Ambulatory Vitals  Encounter Vitals  Temperature: 36.5 °C (97.7 °F)  Temp src: Temporal  Blood Pressure: 110/70  Pulse: 69  Respiration: 20  Pulse Oximetry: 97 %  Weight: 82.2 kg (181 lb 3.5 oz)  Height: 165.1 cm (5' 5\")  BMI (Calculated): 30.16     REVIEW OF SYSTEMS:  A ROS was completed.  Pertinent positives and negatives were included in the HPI, above.  All other systems were reviewed and are negative.    PHYSICAL EXAM:  General/Medical:  Martha presents clean and well-dressed.  She does not appear in any acute distress at this time.  She has no malar rash.  She has good range of motion of her neck.  She does report having new onset allergies and has skin testing on her back at this time.  She reports no allodynia.  She reports no jaw claudication or jaw pain.  She has no upper or lower extremity edema.  She has palpable radial pulses.  She has good capillary refill in her upper extremities.  Auscultation of her heart revealed S1 and S2 with no abnormal sounds.  Vital signs are listed above and are within normal limits.    Neuro:  MENTAL STATUS: awake and alert; no deficits of speech or language; oriented to person, place, and time; affect was appropriate to situation    CRANIAL NERVES:    II: acuity: J5/J3, fields: intact to confrontation, pupils: 3/3 to 2/2 without a relative afferent pupillary defect, discs: sharp    III/IV/VI: versions: intact without nystagmus    V: facial " "sensation: symmetric to light touch    VII: facial expression: symmetric    VIII: hearing: intact to finger rub    IX/X: palate: elevates symmetrically    XI: shoulder shrug: symmetric    XII: tongue: midline    MOTOR:  - bulk: normal throughout  - tone: normal throughout  Upper Extremity Strength  (R/L)    5/5   Elbow flexion 5/5   Elbow extension 5/5   Shoulder abduction 5/5     Lower Extremity Strength  (R/L)   Hip flexion 5/5   Knee extension 5/5   Knee flexion 5/5   Ankle plantarflexion 5/5   Ankle dorsiflexion 5/5     - pronator drift: absent  - abnormal movements: none    SENSATION:  - light touch: Intact  - vibration: Intact  - temperature: Intact  - pinprick: NT  - proprioception: NT  - Romberg: absent    COORDINATION:  - finger to nose: normal, no ataxia on exam  - finger tapping: rapid and accurate, bilaterally  - foot tapping: Rapid and accurate bilaterally    REFLEXES:  Reflex Right Left   BR 1+ 1+   Biceps 1+ 1+   Triceps 1+ 1+   Patellae 1+ 1+   Achilles 1+ 1+   Toes NT NT     GAIT:  - narrow base and normal, with normal arm swing  - heel-walk: Intact  - toe-walk: Intact  - tandem gait: intact    REVIEW OF IMAGING STUDIES: I reviewed the following studies:  MRI Brain:  Date: 5/14/04  W/o and w/ contrast?: without  Indication: \"vision loss, body numbness one week ago, tongue numbness\"  Comparison: none  Impression:  1.   MRI OF THE BRAIN WITHOUT CONTRAST WITHIN NORMAL LIMITS.   2.   MODERATE LEFT MAXILLARY SINUS MUCOSAL THICKENING.           REVIEW OF LABORATORY STUDIES:  No current pertinent labs    ASSESSMENT& PLAN:  1. Migraine with aura and without status migrainosus, not intractable  Jennifer presents to establish with a neurology provider for continued management of her migraines.  She is a former patient of Stacey Medina and was treated with Topamax 50 mg daily preventative and sumatriptan 100 mg as rescue medication.  She feels that she is stable on these medications however she has had an " increase in migraines in the month of April.  She states she had a migraine every other day.  She would like to discuss different rescue medications.  Her neurological exam revealed some diminished reflexes otherwise no concerning findings.  She did have a brain MRI in 2004 which was essentially normal.  With no concerning findings and a normal brain MRI in 2004 I will defer ordering another repeat MRI.  We discussed the following rescue medications: Almotriptan, eletriptan, frovatriptan, naratriptan, rizatriptan, sumatriptan, zolmitriptan, Nurtec, and Ubrelvy.  For her next rescue medication Jennifer decided on zolmitriptan nasal spray.  Administration and side effects were discussed.  She does endorse having occasional nausea with her migraines so I will give her some Zofran to take with zolmitriptan.  She feels the Topamax is working effectively for prevention and she continues to have no side effects from it.  I will continue her Topamax at the current dose.  We discussed lifestyle changes such as: Adequate sleep, staying hydrated, stress reduction, and not overusing over-the-counter analgesics.  She had previously discussed supplemental migraine prevention medication with Stacey.  I will not address these again.  Jennifer does endorse some headaches upon waking up we did discuss possible sleep apnea and a referral to pulmonology for sleep study.  Jennifer will defer for now but may reconsider in the future.  Jennifer can reach me via [email or web address] with any updates, concerns, or questions.  Otherwise I will see her back in 3 to 4 months to discuss her current medication regimen and its effect on her migraines.  - zolmitriptan (ZOMIG) 5 MG nasal solution; Administer 1 Spray into affected nostril(S) one time as needed for Headache (migraine) for up to 1 dose. Can re-dose in 1-2 hours if no resolution of migraine  Dispense: 9 Each; Refill: 5  - topiramate (TOPAMAX) 50 MG tablet; Take 1 Tablet by mouth every day.  Dispense: 30 Tablet;  Refill: 11  - ondansetron (ZOFRAN ODT) 4 MG TABLET DISPERSIBLE; Take 1 Tablet by mouth every 6 hours as needed (migraine associated nausea).  Dispense: 30 Tablet; Refill: 2       BILLING DOCUMENTATION:   I spent 40 minutes in patient care. This includes time with chart review, pre-charting, records review, discussions with other healthcare providers, and documentation. This also includes face-to-face time with the patient for: exam review, discussion and treatment planning.     Estela Velásquez MSN APRN-CNP  [facility] Neurology [place]

## 2024-04-17 ENCOUNTER — OFFICE VISIT (OUTPATIENT)
Dept: NEUROLOGY | Facility: MEDICAL CENTER | Age: 49
End: 2024-04-17
Payer: COMMERCIAL

## 2024-04-17 ENCOUNTER — TELEPHONE (OUTPATIENT)
Dept: NEUROLOGY | Facility: MEDICAL CENTER | Age: 49
End: 2024-04-17
Payer: COMMERCIAL

## 2024-04-17 ENCOUNTER — TELEPHONE (OUTPATIENT)
Dept: NEUROLOGY | Facility: MEDICAL CENTER | Age: 49
End: 2024-04-17

## 2024-04-17 VITALS
HEIGHT: 65 IN | SYSTOLIC BLOOD PRESSURE: 110 MMHG | BODY MASS INDEX: 30.19 KG/M2 | RESPIRATION RATE: 20 BRPM | TEMPERATURE: 97.7 F | DIASTOLIC BLOOD PRESSURE: 70 MMHG | OXYGEN SATURATION: 97 % | HEART RATE: 69 BPM | WEIGHT: 181.22 LBS

## 2024-04-17 DIAGNOSIS — G43.109 MIGRAINE WITH AURA AND WITHOUT STATUS MIGRAINOSUS, NOT INTRACTABLE: ICD-10-CM

## 2024-04-17 PROCEDURE — 99215 OFFICE O/P EST HI 40 MIN: CPT

## 2024-04-17 PROCEDURE — 99212 OFFICE O/P EST SF 10 MIN: CPT

## 2024-04-17 PROCEDURE — 3074F SYST BP LT 130 MM HG: CPT

## 2024-04-17 PROCEDURE — 3078F DIAST BP <80 MM HG: CPT

## 2024-04-17 RX ORDER — ZOLMITRIPTAN 5 MG/1
1 SPRAY NASAL
Qty: 9 EACH | Refills: 5 | Status: SHIPPED | OUTPATIENT
Start: 2024-04-17

## 2024-04-17 RX ORDER — ONDANSETRON 4 MG/1
4 TABLET, ORALLY DISINTEGRATING ORAL EVERY 6 HOURS PRN
Qty: 30 TABLET | Refills: 2 | Status: SHIPPED | OUTPATIENT
Start: 2024-04-17

## 2024-04-17 RX ORDER — TOPIRAMATE 50 MG/1
50 TABLET, FILM COATED ORAL DAILY
Qty: 30 TABLET | Refills: 11 | Status: SHIPPED | OUTPATIENT
Start: 2024-04-17

## 2024-04-17 ASSESSMENT — PATIENT HEALTH QUESTIONNAIRE - PHQ9: CLINICAL INTERPRETATION OF PHQ2 SCORE: 0

## 2024-04-17 NOTE — PATIENT INSTRUCTIONS
At onset of  migraine take one zolmitriptan, one zofran, and two excedrin migraine. If in 1-2 hours your migraine pain is not 0/10 take another zolmitriptan.

## 2024-04-17 NOTE — TELEPHONE ENCOUNTER
Received Refill PA request via [employer]  for Topiramate (Topamax) 50 MG Tabs. (Quantity:90, Day Supply:90) - Copay $8.22 or $2.46 #30/30 DS (No PA req'd)     Insurance: [facility] FEP  Member ID:  [id number]  BIN: [id number]  PCN: [id number]  Group: [id number]     Ran Test claim via [employer] medication Pays for a $8.22 copay. Will outreach to patient to offer specialty pharmacy services and or release to preferred pharmacy

## 2024-04-17 NOTE — TELEPHONE ENCOUNTER
Attempted to contact patient at [phone] to discuss [facility] and services/benefits offered. No answer, left voicemail.    Brigette Putnam

## 2024-04-17 NOTE — TELEPHONE ENCOUNTER
Received [employer] PA request via [employer]  for Zolmitrptan (Zomig) 5 MG Nasal Soln. (Quantity:18, Day Supply:90) - Copay $40.00 or $15.00 #9/30 DS (No PA req'd)     Insurance: [employer] FEP  Member ID:  [id number]  BIN: [id number]  PCN: [id number]   Group: [id number]     Ran Test claim via [place] [facility] medication Pays for a $40.00 copay. Will outreach to patient to offer specialty pharmacy services and or release to preferred pharmacy

## 2024-07-23 ENCOUNTER — OFFICE VISIT (OUTPATIENT)
Dept: NEUROLOGY | Facility: MEDICAL CENTER | Age: 49
End: 2024-07-23
Payer: COMMERCIAL

## 2024-07-23 VITALS
RESPIRATION RATE: 12 BRPM | BODY MASS INDEX: 30.12 KG/M2 | HEART RATE: 71 BPM | HEIGHT: 65 IN | DIASTOLIC BLOOD PRESSURE: 68 MMHG | SYSTOLIC BLOOD PRESSURE: 110 MMHG | WEIGHT: 180.78 LBS | OXYGEN SATURATION: 97 % | TEMPERATURE: 98.3 F

## 2024-07-23 DIAGNOSIS — G43.109 MIGRAINE WITH AURA AND WITHOUT STATUS MIGRAINOSUS, NOT INTRACTABLE: ICD-10-CM

## 2024-07-23 PROCEDURE — 99213 OFFICE O/P EST LOW 20 MIN: CPT

## 2024-07-23 PROCEDURE — 99212 OFFICE O/P EST SF 10 MIN: CPT

## 2024-07-23 PROCEDURE — 3078F DIAST BP <80 MM HG: CPT

## 2024-07-23 PROCEDURE — 3074F SYST BP LT 130 MM HG: CPT

## 2024-07-23 RX ORDER — ONDANSETRON 4 MG/1
4 TABLET, ORALLY DISINTEGRATING ORAL EVERY 6 HOURS PRN
Qty: 30 TABLET | Refills: 11 | Status: SHIPPED | OUTPATIENT
Start: 2024-07-23

## 2024-07-23 RX ORDER — ZOLMITRIPTAN 5 MG/1
1 SPRAY NASAL
Qty: 9 EACH | Refills: 11 | Status: SHIPPED | OUTPATIENT
Start: 2024-07-23

## 2024-12-15 ENCOUNTER — OFFICE VISIT (OUTPATIENT)
Dept: URGENT CARE | Facility: PHYSICIAN GROUP | Age: 49
End: 2024-12-15
Payer: COMMERCIAL

## 2024-12-15 VITALS
DIASTOLIC BLOOD PRESSURE: 76 MMHG | SYSTOLIC BLOOD PRESSURE: 126 MMHG | RESPIRATION RATE: 16 BRPM | TEMPERATURE: 97.2 F | BODY MASS INDEX: 31.32 KG/M2 | HEIGHT: 65 IN | HEART RATE: 79 BPM | WEIGHT: 188 LBS | OXYGEN SATURATION: 97 %

## 2024-12-15 DIAGNOSIS — T36.95XA ANTIBIOTIC-INDUCED YEAST INFECTION: ICD-10-CM

## 2024-12-15 DIAGNOSIS — B37.9 ANTIBIOTIC-INDUCED YEAST INFECTION: ICD-10-CM

## 2024-12-15 DIAGNOSIS — J45.21 MILD INTERMITTENT ASTHMA WITH ACUTE EXACERBATION: ICD-10-CM

## 2024-12-15 DIAGNOSIS — H66.002 NON-RECURRENT ACUTE SUPPURATIVE OTITIS MEDIA OF LEFT EAR WITHOUT SPONTANEOUS RUPTURE OF TYMPANIC MEMBRANE: ICD-10-CM

## 2024-12-15 DIAGNOSIS — R05.1 ACUTE COUGH: ICD-10-CM

## 2024-12-15 PROCEDURE — 3078F DIAST BP <80 MM HG: CPT | Performed by: NURSE PRACTITIONER

## 2024-12-15 PROCEDURE — 3074F SYST BP LT 130 MM HG: CPT | Performed by: NURSE PRACTITIONER

## 2024-12-15 PROCEDURE — 99214 OFFICE O/P EST MOD 30 MIN: CPT | Mod: 25 | Performed by: NURSE PRACTITIONER

## 2024-12-15 RX ORDER — PREDNISONE 20 MG/1
40 TABLET ORAL DAILY
Qty: 10 TABLET | Refills: 0 | Status: SHIPPED | OUTPATIENT
Start: 2024-12-15 | End: 2024-12-20

## 2024-12-15 RX ORDER — BENZONATATE 100 MG/1
100 CAPSULE ORAL 3 TIMES DAILY PRN
Qty: 30 CAPSULE | Refills: 0 | Status: SHIPPED | OUTPATIENT
Start: 2024-12-15 | End: 2024-12-25

## 2024-12-15 RX ORDER — FLUCONAZOLE 150 MG/1
TABLET ORAL
Qty: 2 TABLET | Refills: 0 | Status: SHIPPED | OUTPATIENT
Start: 2024-12-15 | End: 2024-12-15

## 2024-12-15 RX ORDER — DEXTROMETHORPHAN HYDROBROMIDE AND PROMETHAZINE HYDROCHLORIDE 15; 6.25 MG/5ML; MG/5ML
5 SYRUP ORAL EVERY 6 HOURS PRN
Qty: 118 ML | Refills: 0 | Status: SHIPPED | OUTPATIENT
Start: 2024-12-15 | End: 2024-12-22

## 2024-12-15 RX ORDER — FLUCONAZOLE 150 MG/1
TABLET ORAL
Qty: 2 TABLET | Refills: 0 | Status: SHIPPED
Start: 2024-12-15

## 2024-12-15 NOTE — PROGRESS NOTES
Verbal consent was acquired by the patient to use [employer] ambient listening note generation during this visit     Date: 12/15/24        Chief Complaint   Patient presents with    Cough     Congestion, left ear infection (1 day)  X 1 week           History of Present Illness  The patient is a 49-year-old female presenting to the clinic with ear pain that started today and a cough for 1 week.    She has been experiencing a persistent cough for the past week, accompanied by rhinorrhea and pharyngitis. She reports that her cough intensifies during the night. She has no sick contacts at home. She has been managing her symptoms with over-the-counter Tylenol Cold and Flu.    Additionally, she reports the onset of left-sided otalgia yesterday, which she suspects may be due to an ear infection.    She has a known diagnosis of asthma and has been utilizing her inhaler twice daily, a frequency that is unusual for her as she typically does not require the inhaler when she is not ill. She has previously been prescribed prednisone.    She has a history of developing yeast infections as a side effect of antibiotic therapy. She has not previously been treated with Diflucan or fluconazole.    ALLERGIES  The patient has no known allergies.    MEDICATIONS  Current: Tylenol cold and flu, inhaler  Past: Prednisone         ROS:    No severe shortness of breath   No Cardiac like chest pain, as discussed   As otherwise stated in HPI    Medical/SX/ Social History:  Reviewed per chart    Pertinent Medications:    Current Outpatient Medications on File Prior to Visit   Medication Sig Dispense Refill    zolmitriptan (ZOMIG) 5 MG nasal solution Administer 1 Spray into affected nostril(S) one time as needed for Headache (migraine) for up to 1 dose. Can re-dose in 1-2 hours if no resolution of migraine 9 Each 11    ondansetron (ZOFRAN ODT) 4 MG TABLET DISPERSIBLE Take 1 Tablet by mouth every 6 hours as needed (migraine associated nausea). 30  Tablet 11    topiramate (TOPAMAX) 50 MG tablet Take 1 Tablet by mouth every day. 30 Tablet 11    TOBRADEX 0.3-0.1 % Ointment       montelukast (SINGULAIR) 10 MG Tab Take 1 Tablet by mouth at bedtime. 90 Tablet 3    Fluticasone-Umeclidin-Vilant (TRELEGY ELLIPTA) 100-62.5-25 MCG/INH AEROSOL POWDER, BREATH ACTIVATED inhalation Inhale 1 Inhalation every day. 3 Each 3    pravastatin (PRAVACHOL) 40 MG tablet Take 40 mg by mouth every day.      PARoxetine (PAXIL) 20 MG Tab Take 20 mg by mouth.      albuterol 108 (90 Base) MCG/ACT Aero Soln inhalation aerosol Inhale 2 Puffs every four hours as needed for Shortness of Breath. 8.5 g 1    enalapril (VASOTEC) 10 MG Tab Take 10 mg by mouth.  0     No current facility-administered medications on file prior to visit.        Allergies:    Patient has no known allergies.     Problem list, medications, and allergies reviewed by myself today in Epic     Physical Exam:    Vitals:    12/15/24 1309   BP: 126/76   Pulse: 79   Resp: 16   Temp: 36.2 °C (97.2 °F)   SpO2: 97%             Physical Exam  Vitals reviewed.   Constitutional:       General: She is not in acute distress.     Appearance: Normal appearance. She is well-developed. She is not toxic-appearing.   HENT:      Head: Normocephalic and atraumatic.      Right Ear: Ear canal and external ear normal. A middle ear effusion is present. Tympanic membrane is not erythematous or bulging.      Left Ear: Ear canal and external ear normal. A middle ear effusion is present. Tympanic membrane is erythematous and bulging.      Nose: Congestion and rhinorrhea present.      Mouth/Throat:      Lips: Pink.      Mouth: Mucous membranes are moist.      Pharynx: Postnasal drip present.   Eyes:      General: Lids are normal. Gaze aligned appropriately. No allergic shiner or scleral icterus.     Extraocular Movements: Extraocular movements intact.      Conjunctiva/sclera: Conjunctivae normal.      Pupils: Pupils are equal, round, and reactive to  light.   Cardiovascular:      Rate and Rhythm: Normal rate and regular rhythm.      Pulses:           Radial pulses are 2+ on the right side and 2+ on the left side.      Heart sounds: Normal heart sounds.   Pulmonary:      Effort: Pulmonary effort is normal.      Breath sounds: Normal air entry. Wheezing present. No decreased breath sounds, rhonchi or rales.   Musculoskeletal:      Right lower leg: No edema.      Left lower leg: No edema.   Lymphadenopathy:      Cervical: No cervical adenopathy.   Skin:     General: Skin is warm.      Capillary Refill: Capillary refill takes less than 2 seconds.      Coloration: Skin is not cyanotic or pale.      Findings: No rash.   Neurological:      Mental Status: She is alert.      Gait: Gait is intact.   Psychiatric:         Behavior: Behavior normal. Behavior is cooperative.              Medical Decision making and plan :  I personally reviewed prior external notes and test results pertinent to today's visit. Pt is clinically stable at today's acute urgent care visit.  Patient appears nontoxic with no acute distress noted. Appropriate for outpatient care at this time.    Pleasant 49 y.o. female presented clinic with:     Assessment & Plan  1. Left ear infection.  Augmentin will be prescribed for 7 days. If there is no improvement in ear pain after 2 days on the antibiotic, she should return for further evaluation.    2. Asthma.  Chronic condition with acute exacerbation  Prednisone will be prescribed to be taken every morning for 5 days. She is advised to take it in the morning as it can cause insomnia if taken at night.    3. Cough.  Promethazine DM will be prescribed for nighttime use to help dry up mucus and aid sleep. Tessalon Perles will be prescribed for daytime use. If Tessalon Perles are not effective, she should discontinue them and continue with prednisone.    4. Yeast infection prophylaxis.  Due to her history of yeast infections with antibiotics, Diflucan  (fluconazole) will be prescribed. She can take it either preventatively, with one dose at the start of the antibiotic course and another after 3 days, or she can wait to see if symptoms develop and then start treatment.      Shared decision-making was utilized with patient for treatment plan. Medication discussed included indication for use and the potential benefits and side effects. Education was provided regarding the aforementioned assessments.  Differential Diagnosis, natural history, and supportive care discussed. All of the patient's questions were answered to their satisfaction at the time of discharge. Patient was encouraged to monitor symptoms closely. Those signs and symptoms which would warrant concern and mandate seeking a higher level of service through the emergency department discussed at length.  Patient stated agreement and understanding of this plan of care.    Disposition:  Home in stable condition     Voice Recognition Disclaimer:  Portions of this document were created using voice recognition software and [employer] technology provided by [facility]. The software does have a chance of producing errors of grammar and possibly content. I have made every reasonable attempt to correct obvious errors, but there may be errors of grammar and possibly content that I did not discover before finalizing the  documentation.    Nevaeh Mercedes, [email or web address].

## 2025-03-30 ENCOUNTER — APPOINTMENT (OUTPATIENT)
Dept: RADIOLOGY | Facility: MEDICAL CENTER | Age: 50
DRG: 603 | End: 2025-03-30
Attending: EMERGENCY MEDICINE
Payer: COMMERCIAL

## 2025-03-30 ENCOUNTER — HOSPITAL ENCOUNTER (INPATIENT)
Facility: MEDICAL CENTER | Age: 50
End: 2025-03-30
Attending: EMERGENCY MEDICINE | Admitting: INTERNAL MEDICINE
Payer: COMMERCIAL

## 2025-03-30 DIAGNOSIS — L02.216 ABSCESS, UMBILICAL: ICD-10-CM

## 2025-03-30 DIAGNOSIS — L03.311 ABDOMINAL WALL CELLULITIS: ICD-10-CM

## 2025-03-30 PROBLEM — D72.829 LEUKOCYTOSIS: Status: ACTIVE | Noted: 2025-03-30

## 2025-03-30 LAB
ALBUMIN SERPL BCP-MCNC: 4.3 G/DL (ref 3.2–4.9)
ALBUMIN/GLOB SERPL: 1.4 G/DL
ALP SERPL-CCNC: 156 U/L (ref 30–99)
ALT SERPL-CCNC: 20 U/L (ref 2–50)
ANION GAP SERPL CALC-SCNC: 12 MMOL/L (ref 7–16)
AST SERPL-CCNC: 21 U/L (ref 12–45)
BACTERIA BLD CULT: NORMAL
BACTERIA BLD CULT: NORMAL
BASOPHILS # BLD AUTO: 0.6 % (ref 0–1.8)
BASOPHILS # BLD: 0.07 K/UL (ref 0–0.12)
BILIRUB SERPL-MCNC: 0.3 MG/DL (ref 0.1–1.5)
BUN SERPL-MCNC: 10 MG/DL (ref 8–22)
CALCIUM ALBUM COR SERPL-MCNC: 9.3 MG/DL (ref 8.5–10.5)
CALCIUM SERPL-MCNC: 9.5 MG/DL (ref 8.5–10.5)
CHLORIDE SERPL-SCNC: 107 MMOL/L (ref 96–112)
CO2 SERPL-SCNC: 20 MMOL/L (ref 20–33)
CREAT SERPL-MCNC: 0.76 MG/DL (ref 0.5–1.4)
EOSINOPHIL # BLD AUTO: 0.37 K/UL (ref 0–0.51)
EOSINOPHIL NFR BLD: 3.2 % (ref 0–6.9)
ERYTHROCYTE [DISTWIDTH] IN BLOOD BY AUTOMATED COUNT: 45.1 FL (ref 35.9–50)
GFR SERPLBLD CREATININE-BSD FMLA CKD-EPI: 95 ML/MIN/1.73 M 2
GLOBULIN SER CALC-MCNC: 3 G/DL (ref 1.9–3.5)
GLUCOSE SERPL-MCNC: 92 MG/DL (ref 65–99)
GRAM STN SPEC: NORMAL
HCT VFR BLD AUTO: 45.1 % (ref 37–47)
HGB BLD-MCNC: 15.1 G/DL (ref 12–16)
IMM GRANULOCYTES # BLD AUTO: 0.04 K/UL (ref 0–0.11)
IMM GRANULOCYTES NFR BLD AUTO: 0.4 % (ref 0–0.9)
LACTATE SERPL-SCNC: 0.9 MMOL/L (ref 0.5–2)
LYMPHOCYTES # BLD AUTO: 1.98 K/UL (ref 1–4.8)
LYMPHOCYTES NFR BLD: 17.4 % (ref 22–41)
MCH RBC QN AUTO: 32 PG (ref 27–33)
MCHC RBC AUTO-ENTMCNC: 33.5 G/DL (ref 32.2–35.5)
MCV RBC AUTO: 95.6 FL (ref 81.4–97.8)
MONOCYTES # BLD AUTO: 0.94 K/UL (ref 0–0.85)
MONOCYTES NFR BLD AUTO: 8.2 % (ref 0–13.4)
NEUTROPHILS # BLD AUTO: 8.01 K/UL (ref 1.82–7.42)
NEUTROPHILS NFR BLD: 70.2 % (ref 44–72)
NRBC # BLD AUTO: 0 K/UL
NRBC BLD-RTO: 0 /100 WBC (ref 0–0.2)
PLATELET # BLD AUTO: 333 K/UL (ref 164–446)
PMV BLD AUTO: 9.3 FL (ref 9–12.9)
POTASSIUM SERPL-SCNC: 4.3 MMOL/L (ref 3.6–5.5)
PROT SERPL-MCNC: 7.3 G/DL (ref 6–8.2)
RBC # BLD AUTO: 4.72 M/UL (ref 4.2–5.4)
SCCMEC + MECA PNL NOSE NAA+PROBE: NEGATIVE
SIGNIFICANT IND 70042: NORMAL
SITE SITE: NORMAL
SODIUM SERPL-SCNC: 139 MMOL/L (ref 135–145)
SOURCE SOURCE: NORMAL
WBC # BLD AUTO: 11.4 K/UL (ref 4.8–10.8)

## 2025-03-30 PROCEDURE — 87070 CULTURE OTHR SPECIMN AEROBIC: CPT

## 2025-03-30 PROCEDURE — 87641 MR-STAPH DNA AMP PROBE: CPT

## 2025-03-30 PROCEDURE — 99223 1ST HOSP IP/OBS HIGH 75: CPT | Performed by: INTERNAL MEDICINE

## 2025-03-30 PROCEDURE — 700117 HCHG RX CONTRAST REV CODE 255: Performed by: EMERGENCY MEDICINE

## 2025-03-30 PROCEDURE — 80053 COMPREHEN METABOLIC PANEL: CPT

## 2025-03-30 PROCEDURE — 99285 EMERGENCY DEPT VISIT HI MDM: CPT

## 2025-03-30 PROCEDURE — 700102 HCHG RX REV CODE 250 W/ 637 OVERRIDE(OP): Performed by: INTERNAL MEDICINE

## 2025-03-30 PROCEDURE — 87040 BLOOD CULTURE FOR BACTERIA: CPT

## 2025-03-30 PROCEDURE — 700111 HCHG RX REV CODE 636 W/ 250 OVERRIDE (IP): Performed by: EMERGENCY MEDICINE

## 2025-03-30 PROCEDURE — 700105 HCHG RX REV CODE 258: Performed by: INTERNAL MEDICINE

## 2025-03-30 PROCEDURE — 96365 THER/PROPH/DIAG IV INF INIT: CPT

## 2025-03-30 PROCEDURE — 96367 TX/PROPH/DG ADDL SEQ IV INF: CPT

## 2025-03-30 PROCEDURE — 96375 TX/PRO/DX INJ NEW DRUG ADDON: CPT

## 2025-03-30 PROCEDURE — A9270 NON-COVERED ITEM OR SERVICE: HCPCS | Performed by: INTERNAL MEDICINE

## 2025-03-30 PROCEDURE — 700101 HCHG RX REV CODE 250: Performed by: EMERGENCY MEDICINE

## 2025-03-30 PROCEDURE — 99222 1ST HOSP IP/OBS MODERATE 55: CPT | Performed by: STUDENT IN AN ORGANIZED HEALTH CARE EDUCATION/TRAINING PROGRAM

## 2025-03-30 PROCEDURE — 700105 HCHG RX REV CODE 258: Performed by: EMERGENCY MEDICINE

## 2025-03-30 PROCEDURE — 83605 ASSAY OF LACTIC ACID: CPT

## 2025-03-30 PROCEDURE — 0J980ZZ DRAINAGE OF ABDOMEN SUBCUTANEOUS TISSUE AND FASCIA, OPEN APPROACH: ICD-10-PCS | Performed by: EMERGENCY MEDICINE

## 2025-03-30 PROCEDURE — 700111 HCHG RX REV CODE 636 W/ 250 OVERRIDE (IP): Mod: JZ | Performed by: INTERNAL MEDICINE

## 2025-03-30 PROCEDURE — 87205 SMEAR GRAM STAIN: CPT

## 2025-03-30 PROCEDURE — 74177 CT ABD & PELVIS W/CONTRAST: CPT

## 2025-03-30 PROCEDURE — 303977 HCHG I & D

## 2025-03-30 PROCEDURE — 36415 COLL VENOUS BLD VENIPUNCTURE: CPT

## 2025-03-30 PROCEDURE — 85025 COMPLETE CBC W/AUTO DIFF WBC: CPT

## 2025-03-30 PROCEDURE — 770006 HCHG ROOM/CARE - MED/SURG/GYN SEMI*

## 2025-03-30 RX ORDER — OXYCODONE HYDROCHLORIDE 5 MG/1
2.5 TABLET ORAL
Refills: 0 | Status: DISCONTINUED | OUTPATIENT
Start: 2025-03-30 | End: 2025-04-02 | Stop reason: HOSPADM

## 2025-03-30 RX ORDER — HYDRALAZINE HYDROCHLORIDE 20 MG/ML
10 INJECTION INTRAMUSCULAR; INTRAVENOUS EVERY 4 HOURS PRN
Status: DISCONTINUED | OUTPATIENT
Start: 2025-03-30 | End: 2025-04-02 | Stop reason: HOSPADM

## 2025-03-30 RX ORDER — ALBUTEROL SULFATE 90 UG/1
2 INHALANT RESPIRATORY (INHALATION) EVERY 4 HOURS PRN
Status: DISCONTINUED | OUTPATIENT
Start: 2025-03-30 | End: 2025-04-02 | Stop reason: HOSPADM

## 2025-03-30 RX ORDER — POLYETHYLENE GLYCOL 3350 17 G/17G
1 POWDER, FOR SOLUTION ORAL
Status: DISCONTINUED | OUTPATIENT
Start: 2025-03-30 | End: 2025-04-02 | Stop reason: HOSPADM

## 2025-03-30 RX ORDER — MORPHINE SULFATE 4 MG/ML
4 INJECTION INTRAVENOUS
Status: DISCONTINUED | OUTPATIENT
Start: 2025-03-30 | End: 2025-04-02 | Stop reason: HOSPADM

## 2025-03-30 RX ORDER — HYDROMORPHONE HYDROCHLORIDE 2 MG/ML
0.25 INJECTION, SOLUTION INTRAMUSCULAR; INTRAVENOUS; SUBCUTANEOUS
Status: DISCONTINUED | OUTPATIENT
Start: 2025-03-30 | End: 2025-04-02 | Stop reason: HOSPADM

## 2025-03-30 RX ORDER — SODIUM CHLORIDE, SODIUM LACTATE, POTASSIUM CHLORIDE, CALCIUM CHLORIDE 600; 310; 30; 20 MG/100ML; MG/100ML; MG/100ML; MG/100ML
INJECTION, SOLUTION INTRAVENOUS CONTINUOUS
Status: DISCONTINUED | OUTPATIENT
Start: 2025-03-30 | End: 2025-04-02 | Stop reason: HOSPADM

## 2025-03-30 RX ORDER — ACETAMINOPHEN 325 MG/1
650 TABLET ORAL EVERY 6 HOURS PRN
Status: DISCONTINUED | OUTPATIENT
Start: 2025-03-30 | End: 2025-04-02 | Stop reason: HOSPADM

## 2025-03-30 RX ORDER — OXYCODONE HYDROCHLORIDE 5 MG/1
5 TABLET ORAL
Refills: 0 | Status: DISCONTINUED | OUTPATIENT
Start: 2025-03-30 | End: 2025-04-02 | Stop reason: HOSPADM

## 2025-03-30 RX ORDER — MONTELUKAST SODIUM 10 MG/1
10 TABLET ORAL DAILY
Status: DISCONTINUED | OUTPATIENT
Start: 2025-03-31 | End: 2025-04-02 | Stop reason: HOSPADM

## 2025-03-30 RX ORDER — TOPIRAMATE 25 MG/1
50 TABLET, FILM COATED ORAL DAILY
Status: DISCONTINUED | OUTPATIENT
Start: 2025-03-31 | End: 2025-04-02 | Stop reason: HOSPADM

## 2025-03-30 RX ORDER — PROMETHAZINE HYDROCHLORIDE 25 MG/1
12.5-25 TABLET ORAL EVERY 4 HOURS PRN
Status: DISCONTINUED | OUTPATIENT
Start: 2025-03-30 | End: 2025-04-02 | Stop reason: HOSPADM

## 2025-03-30 RX ORDER — ENOXAPARIN SODIUM 100 MG/ML
40 INJECTION SUBCUTANEOUS DAILY
Status: DISCONTINUED | OUTPATIENT
Start: 2025-03-30 | End: 2025-04-02 | Stop reason: HOSPADM

## 2025-03-30 RX ORDER — ONDANSETRON 2 MG/ML
4 INJECTION INTRAMUSCULAR; INTRAVENOUS EVERY 4 HOURS PRN
Status: DISCONTINUED | OUTPATIENT
Start: 2025-03-30 | End: 2025-04-02 | Stop reason: HOSPADM

## 2025-03-30 RX ORDER — ENALAPRIL MALEATE 5 MG/1
5 TABLET ORAL DAILY
COMMUNITY

## 2025-03-30 RX ORDER — PRAVASTATIN SODIUM 20 MG
40 TABLET ORAL DAILY
Status: DISCONTINUED | OUTPATIENT
Start: 2025-03-31 | End: 2025-04-02 | Stop reason: HOSPADM

## 2025-03-30 RX ORDER — PROMETHAZINE HYDROCHLORIDE 25 MG/1
12.5-25 SUPPOSITORY RECTAL EVERY 4 HOURS PRN
Status: DISCONTINUED | OUTPATIENT
Start: 2025-03-30 | End: 2025-04-02 | Stop reason: HOSPADM

## 2025-03-30 RX ORDER — PAROXETINE 20 MG/1
20 TABLET, FILM COATED ORAL DAILY
Status: DISCONTINUED | OUTPATIENT
Start: 2025-03-31 | End: 2025-04-02 | Stop reason: HOSPADM

## 2025-03-30 RX ORDER — ONDANSETRON 4 MG/1
4 TABLET, ORALLY DISINTEGRATING ORAL EVERY 4 HOURS PRN
Status: DISCONTINUED | OUTPATIENT
Start: 2025-03-30 | End: 2025-04-02 | Stop reason: HOSPADM

## 2025-03-30 RX ORDER — ENALAPRIL MALEATE 2.5 MG/1
5 TABLET ORAL DAILY
Status: DISCONTINUED | OUTPATIENT
Start: 2025-03-31 | End: 2025-04-02 | Stop reason: HOSPADM

## 2025-03-30 RX ORDER — AMOXICILLIN 250 MG
2 CAPSULE ORAL EVERY EVENING
Status: DISCONTINUED | OUTPATIENT
Start: 2025-03-30 | End: 2025-04-02 | Stop reason: HOSPADM

## 2025-03-30 RX ORDER — IBUPROFEN 200 MG
800 TABLET ORAL EVERY 6 HOURS PRN
COMMUNITY

## 2025-03-30 RX ORDER — PROCHLORPERAZINE EDISYLATE 5 MG/ML
5-10 INJECTION INTRAMUSCULAR; INTRAVENOUS EVERY 4 HOURS PRN
Status: DISCONTINUED | OUTPATIENT
Start: 2025-03-30 | End: 2025-04-02 | Stop reason: HOSPADM

## 2025-03-30 RX ORDER — SODIUM CHLORIDE 9 MG/ML
INJECTION, SOLUTION INTRAVENOUS CONTINUOUS
Status: DISCONTINUED | OUTPATIENT
Start: 2025-03-30 | End: 2025-03-30

## 2025-03-30 RX ORDER — LIDOCAINE HYDROCHLORIDE AND EPINEPHRINE 10; 10 MG/ML; UG/ML
5 INJECTION, SOLUTION INFILTRATION; PERINEURAL ONCE
Status: COMPLETED | OUTPATIENT
Start: 2025-03-30 | End: 2025-03-30

## 2025-03-30 RX ADMIN — IOHEXOL 95 ML: 350 INJECTION, SOLUTION INTRAVENOUS at 14:45

## 2025-03-30 RX ADMIN — OXYCODONE HYDROCHLORIDE 5 MG: 5 TABLET ORAL at 17:03

## 2025-03-30 RX ADMIN — AMPICILLIN AND SULBACTAM 3 G: 1; 2 INJECTION, POWDER, FOR SOLUTION INTRAMUSCULAR; INTRAVENOUS at 21:56

## 2025-03-30 RX ADMIN — OXYCODONE HYDROCHLORIDE 5 MG: 5 TABLET ORAL at 20:19

## 2025-03-30 RX ADMIN — SODIUM CHLORIDE: 9 INJECTION, SOLUTION INTRAVENOUS at 13:46

## 2025-03-30 RX ADMIN — VANCOMYCIN HYDROCHLORIDE 2000 MG: 5 INJECTION, POWDER, LYOPHILIZED, FOR SOLUTION INTRAVENOUS at 14:57

## 2025-03-30 RX ADMIN — LIDOCAINE HYDROCHLORIDE AND EPINEPHRINE 5 ML: 10; 10 INJECTION, SOLUTION INFILTRATION; PERINEURAL at 15:15

## 2025-03-30 RX ADMIN — ENOXAPARIN SODIUM 40 MG: 100 INJECTION SUBCUTANEOUS at 17:03

## 2025-03-30 RX ADMIN — PIPERACILLIN AND TAZOBACTAM 3.38 G: 3; .375 INJECTION, POWDER, FOR SOLUTION INTRAVENOUS at 13:46

## 2025-03-30 RX ADMIN — MORPHINE SULFATE 4 MG: 4 INJECTION INTRAVENOUS at 13:46

## 2025-03-30 RX ADMIN — SODIUM CHLORIDE, POTASSIUM CHLORIDE, SODIUM LACTATE AND CALCIUM CHLORIDE: 600; 310; 30; 20 INJECTION, SOLUTION INTRAVENOUS at 17:24

## 2025-03-30 SDOH — ECONOMIC STABILITY: TRANSPORTATION INSECURITY
IN THE PAST 12 MONTHS, HAS THE LACK OF TRANSPORTATION KEPT YOU FROM MEDICAL APPOINTMENTS OR FROM GETTING MEDICATIONS?: NO

## 2025-03-30 SDOH — ECONOMIC STABILITY: TRANSPORTATION INSECURITY
IN THE PAST 12 MONTHS, HAS LACK OF RELIABLE TRANSPORTATION KEPT YOU FROM MEDICAL APPOINTMENTS, MEETINGS, WORK OR FROM GETTING THINGS NEEDED FOR DAILY LIVING?: NO

## 2025-03-30 ASSESSMENT — ENCOUNTER SYMPTOMS
PHOTOPHOBIA: 0
CHILLS: 0
SPUTUM PRODUCTION: 0
FOCAL WEAKNESS: 0
BLURRED VISION: 0
NECK PAIN: 0
HALLUCINATIONS: 0
WEIGHT LOSS: 0
ORTHOPNEA: 0
ABDOMINAL PAIN: 1
FEVER: 0
CONSTIPATION: 0
COUGH: 0
TREMORS: 0
TINGLING: 0
DOUBLE VISION: 0
VOMITING: 0
MYALGIAS: 0
NAUSEA: 0
DIARRHEA: 0
DIZZINESS: 0
HEADACHES: 0
PALPITATIONS: 0
SENSORY CHANGE: 0
SHORTNESS OF BREATH: 0
EYE PAIN: 0
BACK PAIN: 0
SPEECH CHANGE: 0

## 2025-03-30 ASSESSMENT — LIFESTYLE VARIABLES
TOTAL SCORE: 0
CONSUMPTION TOTAL: POSITIVE
HOW MANY TIMES IN THE PAST YEAR HAVE YOU HAD 5 OR MORE DRINKS IN A DAY: 1
EVER FELT BAD OR GUILTY ABOUT YOUR DRINKING: NO
HAVE YOU EVER FELT YOU SHOULD CUT DOWN ON YOUR DRINKING: NO
ON A TYPICAL DAY WHEN YOU DRINK ALCOHOL HOW MANY DRINKS DO YOU HAVE: 3
DOES PATIENT WANT TO STOP DRINKING: NO
AVERAGE NUMBER OF DAYS PER WEEK YOU HAVE A DRINK CONTAINING ALCOHOL: 0
SUBSTANCE_ABUSE: 0
ALCOHOL_USE: YES
EVER HAD A DRINK FIRST THING IN THE MORNING TO STEADY YOUR NERVES TO GET RID OF A HANGOVER: NO
HAVE PEOPLE ANNOYED YOU BY CRITICIZING YOUR DRINKING: NO

## 2025-03-30 ASSESSMENT — SOCIAL DETERMINANTS OF HEALTH (SDOH)

## 2025-03-30 ASSESSMENT — COGNITIVE AND FUNCTIONAL STATUS - GENERAL
MOBILITY SCORE: 24
DAILY ACTIVITIY SCORE: 24
SUGGESTED CMS G CODE MODIFIER MOBILITY: CH
SUGGESTED CMS G CODE MODIFIER DAILY ACTIVITY: CH

## 2025-03-30 ASSESSMENT — PATIENT HEALTH QUESTIONNAIRE - PHQ9
1. LITTLE INTEREST OR PLEASURE IN DOING THINGS: NOT AT ALL
2. FEELING DOWN, DEPRESSED, IRRITABLE, OR HOPELESS: NOT AT ALL
SUM OF ALL RESPONSES TO PHQ9 QUESTIONS 1 AND 2: 0

## 2025-03-30 ASSESSMENT — PAIN DESCRIPTION - PAIN TYPE
TYPE: ACUTE PAIN

## 2025-03-30 NOTE — ED PROVIDER NOTES
"ER Provider Note    Scribed for Lisandro Cruz II, M.D. by Avelino Levin. 3/30/2025  12:53 PM    Primary Care Provider: Justen Luis M.D. (Inactive)    CHIEF COMPLAINT   Chief Complaint   Patient presents with    Other     Px belly button is protruding, red, and edematous. Rash to upper left side of her abdomen as well. Hot to touch.          HPI/ROS  LIMITATION TO HISTORY   Select: : None  OUTSIDE HISTORIAN(S):  None    Jennifer Monge is a 49 y.o. female who presents to the ED for evaluation of abdominal pain around the belly button onset Wednesday, this morning with worsening pain and redness. On Thursday the patient scheduled an appointment with general surgery for a few weeks out with concerns for incarcerated umbilical hernia, but worsening symptoms prompted her to come to the ED today. She describes swelling at the belly button feeling like a \"tennis ball,\" but also said it was previously not as large. The patient reports the redness has spread from the original area since symptom onset. She was able to reduce the area initially, but she is no longer able to. She has passed flatus and denies any vomiting.     PAST MEDICAL HISTORY  Past Medical History:   Diagnosis Date    ASTHMA     Back pain     Back pain     Chest tightness     Chickenpox     Cough     Depression     Difficulty breathing     Frequent headaches     Hypertension     Migraine     Shortness of breath     Snoring     Sweat, sweating, excessive     Wears glasses     Wheezing      SURGICAL HISTORY  Past Surgical History:   Procedure Laterality Date    LASER ABLATION  2012    SINUSCOPE      TONSILLECTOMY      age 11    TUBAL COAGULATION LAPAROSCOPIC BILATERAL       FAMILY HISTORY  Family History   Problem Relation Age of Onset    Breast Cancer Maternal Aunt     Lung Cancer Paternal Grandmother      SOCIAL HISTORY   reports that she has never smoked. She has never used smokeless tobacco. She reports that she does not drink alcohol and does " "not use drugs.    CURRENT MEDICATIONS  Previous Medications    ALBUTEROL 108 (90 BASE) MCG/ACT AERO SOLN INHALATION AEROSOL    Inhale 2 Puffs every four hours as needed for Shortness of Breath.    ENALAPRIL (VASOTEC) 10 MG TAB    Take 10 mg by mouth.    FLUCONAZOLE (DIFLUCAN) 150 MG TABLET    TAKE 1 TABLET BY MOUTH FOR YEAST INFECTION, IF SYMPTOMS PERSIST, MAY REPEAT TREATMENT AFTER 72 HOURS    FLUTICASONE-UMECLIDIN-VILANT (TRELEGY ELLIPTA) 100-62.5-25 MCG/INH AEROSOL POWDER, BREATH ACTIVATED INHALATION    Inhale 1 Inhalation every day.    MONTELUKAST (SINGULAIR) 10 MG TAB    Take 1 Tablet by mouth at bedtime.    ONDANSETRON (ZOFRAN ODT) 4 MG TABLET DISPERSIBLE    Take 1 Tablet by mouth every 6 hours as needed (migraine associated nausea).    PAROXETINE (PAXIL) 20 MG TAB    Take 20 mg by mouth.    PRAVASTATIN (PRAVACHOL) 40 MG TABLET    Take 40 mg by mouth every day.    TOBRADEX 0.3-0.1 % OINTMENT        TOPIRAMATE (TOPAMAX) 50 MG TABLET    Take 1 Tablet by mouth every day.    ZOLMITRIPTAN (ZOMIG) 5 MG NASAL SOLUTION    Administer 1 Spray into affected nostril(S) one time as needed for Headache (migraine) for up to 1 dose. Can re-dose in 1-2 hours if no resolution of migraine     ALLERGIES  Patient has no known allergies.    PHYSICAL EXAM  /80   Pulse 85   Temp 35.8 °C (96.5 °F) (Temporal)   Resp 16   Ht 1.651 m (5' 5\")   Wt 84.3 kg (185 lb 13.6 oz)   SpO2 93%   BMI 30.93 kg/m²   Physical Exam  Vitals and nursing note reviewed.   Constitutional:       Appearance: Normal appearance.   HENT:      Mouth/Throat:      Mouth: Mucous membranes are moist.   Eyes:      Extraocular Movements: Extraocular movements intact.      Pupils: Pupils are equal, round, and reactive to light.   Cardiovascular:      Rate and Rhythm: Normal rate and regular rhythm.   Pulmonary:      Effort: Pulmonary effort is normal.      Breath sounds: Normal breath sounds.   Abdominal:      Comments: Fluctuance and swelling an umbilicus " with surrounding erythema and induration. Erythema  tracking to the LUQ. Unable to palpate deep hernia.    Neurological:      General: No focal deficit present.      Mental Status: She is alert and oriented to person, place, and time.      Cranial Nerves: No cranial nerve deficit.   Psychiatric:         Mood and Affect: Mood normal.     DIAGNOSTIC STUDIES    EKG/LABS  Results for orders placed or performed during the hospital encounter of 03/30/25   CBC WITH DIFFERENTIAL    Collection Time: 03/30/25  1:16 PM   Result Value Ref Range    WBC 11.4 (H) 4.8 - 10.8 K/uL    RBC 4.72 4.20 - 5.40 M/uL    Hemoglobin 15.1 12.0 - 16.0 g/dL    Hematocrit 45.1 37.0 - 47.0 %    MCV 95.6 81.4 - 97.8 fL    MCH 32.0 27.0 - 33.0 pg    MCHC 33.5 32.2 - 35.5 g/dL    RDW 45.1 35.9 - 50.0 fL    Platelet Count 333 164 - 446 K/uL    MPV 9.3 9.0 - 12.9 fL    Neutrophils-Polys 70.20 44.00 - 72.00 %    Lymphocytes 17.40 (L) 22.00 - 41.00 %    Monocytes 8.20 0.00 - 13.40 %    Eosinophils 3.20 0.00 - 6.90 %    Basophils 0.60 0.00 - 1.80 %    Immature Granulocytes 0.40 0.00 - 0.90 %    Nucleated RBC 0.00 0.00 - 0.20 /100 WBC    Neutrophils (Absolute) 8.01 (H) 1.82 - 7.42 K/uL    Lymphs (Absolute) 1.98 1.00 - 4.80 K/uL    Monos (Absolute) 0.94 (H) 0.00 - 0.85 K/uL    Eos (Absolute) 0.37 0.00 - 0.51 K/uL    Baso (Absolute) 0.07 0.00 - 0.12 K/uL    Immature Granulocytes (abs) 0.04 0.00 - 0.11 K/uL    NRBC (Absolute) 0.00 K/uL   COMP METABOLIC PANEL    Collection Time: 03/30/25  1:16 PM   Result Value Ref Range    Sodium 139 135 - 145 mmol/L    Potassium 4.3 3.6 - 5.5 mmol/L    Chloride 107 96 - 112 mmol/L    Co2 20 20 - 33 mmol/L    Anion Gap 12.0 7.0 - 16.0    Glucose 92 65 - 99 mg/dL    Bun 10 8 - 22 mg/dL    Creatinine 0.76 0.50 - 1.40 mg/dL    Calcium 9.5 8.5 - 10.5 mg/dL    Correct Calcium 9.3 8.5 - 10.5 mg/dL    AST(SGOT) 21 12 - 45 U/L    ALT(SGPT) 20 2 - 50 U/L    Alkaline Phosphatase 156 (H) 30 - 99 U/L    Total Bilirubin 0.3 0.1 - 1.5  mg/dL    Albumin 4.3 3.2 - 4.9 g/dL    Total Protein 7.3 6.0 - 8.2 g/dL    Globulin 3.0 1.9 - 3.5 g/dL    A-G Ratio 1.4 g/dL   LACTIC ACID    Collection Time: 03/30/25  1:16 PM   Result Value Ref Range    Lactic Acid 0.9 0.5 - 2.0 mmol/L   Blood Culture - Draw one from central line and one from peripheral site    Collection Time: 03/30/25  1:16 PM    Specimen: Line; Blood   Result Value Ref Range    Significant Indicator NEG     Source BLD     Site Peripheral     Culture Result       No Growth  Note: Blood cultures are incubated for 5 days and  are monitored continuously.Positive blood cultures  are called to the RN and reported as soon as  they are identified.     MRSA By PCR (Amp)    Collection Time: 03/30/25  1:16 PM    Specimen: Nares; Respirate   Result Value Ref Range    MRSA by PCR Negative Negative   ESTIMATED GFR    Collection Time: 03/30/25  1:16 PM   Result Value Ref Range    GFR (CKD-EPI) 95 >60 mL/min/1.73 m 2   Blood Culture - Draw one from central line and one from peripheral site    Collection Time: 03/30/25  1:41 PM    Specimen: Peripheral; Blood   Result Value Ref Range    Significant Indicator NEG     Source BLD     Site PERIPHERAL     Culture Result       No Growth  Note: Blood cultures are incubated for 5 days and  are monitored continuously.Positive blood cultures  are called to the RN and reported as soon as  they are identified.       RADIOLOGY/PROCEDURES   The attending emergency physician has independently interpreted the diagnostic imaging associated with this visit and am waiting the final reading from the radiologist.   My preliminary interpretation is a follows: Abscess at umbilical region.    Radiologist interpretation:  CT-ABDOMEN-PELVIS WITH   Final Result      1. Umbilical abscess measuring 3.3 x 2.4 x 2.5 cm in diameter.   2. No hernia or communication with the peritoneal cavity.   3. Cholelithiasis.   4. Uterine fibroid.   5. Atherosclerosis.   6. Multiple hypodense lesions in the  liver and spleen, most compatible with cysts or hemangiomata.        Point of Care Ultrasound    ED ULTRASOUND GUIDED ABSCESS INCISION AND DRAINAGE    Indication: Abscess    Procedure: Bedside ultrasound was utilized to identify and localize fluid collection and image retained as below.  The patient was positioned appropriately and the skin over the incision site was prepped with betadine and draped in a sterile fashion. Local anesthesia was obtained by infiltration using 1% Lidocaine with epinephrine.  An incision using an 11 blade was then made over the left side of the umbilicus and approximately 10 cc of purulent material was expressed. Loculations were broken up using forceps and more of the material was able to be expressed. The drainage cavity was then irrigated and packed with sterile gauze.     The patient tolerated the procedure well.    Complications: None     Image retained through [facility] as seen below:       US interpretation: Abscess    This study is a limited ultrasound examination performed and interpreted to evaluate for limited conditions as outlined above. There may be other clinically important information contained in the images that is outside this scope. When clinically warranted, a comprehensive ultrasound through the appropriate department is considered.     COURSE & MEDICAL DECISION MAKING     ASSESSMENT, COURSE AND PLAN  Care Narrative:     12:53 PM - This is an emergency department evaluation of a 49 y.o. female who presents with concerns for incarcerated hernia with abdominal pain at the belly button with spreading redness. Lactic acid, CMP, cbc with diff, MRSA by PCR, blood cultures, and CT abdomen pelvis ordered to evaluate. Differential diagnoses include but are not limited to abscess with abdominal wall cellulitis vs incarcerated hernia. Will consult with pharmacy to discuss antibiotics. I spoke with Dr. Multani (Surgery). She will be medicated with morphine 4 mg for pain.     1:03 PM   - I discussed the patient's case and the above findings with Sigifredo Dillard who recommended vancomycin and zosyn. Ordered Vancomycin 2000 mg IV, Zosyn 3.375 g IV.     2:47 PM - I spoke with Dr. Multani (Surgery).     2:56 PM - The patient was reevaluated at bedside. Informed her of my plan for incision and drainage and plan for hospitalization for IV antibiotics.     2:58 PM - I discussed the patient's case and the above findings with Dr. Osorio (Hospitalist) who agrees to evaluate the patient for hospitalization.     3:16 PM - The patient was reevaluated at bedside. Performed incision and drainage at this time as noted above.     PROBLEM LIST  #Umbilical hernia with abdominal cellulitis    DISPOSITION AND DISCUSSIONS  I have discussed management of the patient with the following physicians and ERIK's:  Dr. Multani (Surgery), Dr. Osorio (Hospitalist)     Discussion of management with other \[facility] [place] [facility]\"" or appropriate source(s): Pharm[facility]      DISPOSITION:  Patient will be hospitalized by Dr. Osorio in guarded condition.     FINAL DIAGNOSIS  1. Abscess, umbilical    2. Abdominal wall cellulitis    +Incision and drainage     Avelino MONZON ([place]), am scribing for, and in the presence of, SIMÓN Burr II.    Electronically signed by: Avelino Levin ([place]), 3/30/2025    Lisandro MONZON II, M* personally performed the services described in this documentation, as scribed by Avelino Levin in my presence, and it is both accurate and complete.     The note accurately reflects work and decisions made by me.  Lisandro Cruz II, M.D.  3/30/2025  7:46 PM

## 2025-03-30 NOTE — PROGRESS NOTES
Pharmacy Vancomycin Kinetics Note for 3/30/2025     49 y.o. female on Vancomycin day # 1     Vancomycin Indication (AUC Dosing): Skin/skin structure infection (umbilical abscess)    Provider specified end date: 04/04/25    Active Antibiotics (From admission, onward)      Ordered     Ordering Provider       Sun Mar 30, 2025  3:34 PM    03/30/25 1534  MD Alert...Vancomycin per Pharmacy  (MD Alert...Vancomycin per Pharmacy)  PHARMACY TO DOSE        Question:  Indication(s) for vancomycin?  Answer:  Skin and soft tissue infection    MOOK Martinez Mar 30, 2025  3:33 PM    03/30/25 1533  ampicillin/sulbactam (Unasyn) 3 g in  mL IVPB  EVERY 6 HOURS         MOOK Martinez Mar 30, 2025  1:09 PM    03/30/25 1309  vancomycin (Vancocin) 2,000 mg in  mL IVPB  (vancomycin (VANCOCIN) IV (LD + Maintenance))  ONCE         Lisandro Cruz II, M.D.            Dosing Weight: 84 kg (185 lb 3 oz)      Admission History: Admitted on 3/30/2025 for Abscess, umbilical [L02.216]  Pertinent history: Pt with no pertinent medical history presented to ER with protruding umbilicus region and surrounding erythema and warmth. CT abdomen did demonstrate abscess surrounding umbilical region. VS WNL, WBC 11.4.    Allergies:     Patient has no known allergies.     Pertinent cultures to date:     Results       Procedure Component Value Units Date/Time    CULTURE WOUND W/ GRAM STAIN [[id number]] Collected: 03/30/25 1541    Order Status: Sent Specimen: Wound from Abscess Updated: 03/30/25 1630    Blood Culture - Draw one from central line and one from peripheral site [[id number]] Collected: 03/30/25 1341    Order Status: Completed Specimen: Blood from Peripheral Updated: 03/30/25 1608     Significant Indicator NEG     Source BLD     Site PERIPHERAL     Culture Result No Growth  Note: Blood cultures are incubated for 5 days and  are monitored continuously.Positive blood cultures  are called to the  "RN and reported as soon as  they are identified.      MRSA By PCR (Amp) [[id number]] Collected: 25    Order Status: Completed Specimen: Respirate from Nares Updated: 25 1557     MRSA by PCR Negative    MRSA By PCR (Amp) [[id number]]     Order Status: Canceled Specimen: Respirate from Nares     Blood Culture - Draw one from central line and one from peripheral site [[id number]] Collected: 25    Order Status: Sent Specimen: Blood from Line Updated: 25 1450            Labs:     Estimated Creatinine Clearance: 94.9 mL/min (by C-G formula based on SCr of 0.76 mg/dL).  Recent Labs     25   WBC 11.4*   NEUTSPOLYS 70.20     Recent Labs     25   BUN 10   CREATININE 0.76   ALBUMIN 4.3       Intake/Output Summary (Last 24 hours) at 3/30/2025 1631  Last data filed at 3/30/2025 1416  Gross per 24 hour   Intake 100 ml   Output --   Net 100 ml      BP (!) 140/82   Pulse 70   Temp 35.9 °C (96.6 °F) (Temporal)   Resp 18   Ht 1.651 m (5' 5\")   Wt 84.3 kg (185 lb 13.6 oz)   SpO2 96%  Temp (24hrs), Av.9 °C (96.6 °F), Min:35.8 °C (96.5 °F), Max:35.9 °C (96.6 °F)      List concerns for Vancomycin clearance:     Receipt of contrast dye;Obesity    Pharmacokinetics:     AUC kinetics:   Ke (hr ^-1): 0.0833 hr^-1  Half life: 8.32 hr  Clearance: 5.259  Estimated TDD: 2629.5  Estimated Dose: 1128  Estimated interval: 10.3    A/P:     -  Vancomycin dose: 1250mg (15mg/kg) every 12 hours. Loading dose already given in ED     -  Next vancomycin level(s): not yet ordered, steady state (peak after dose 3, trough prior to dose 4)     -  Predicted vancomycin AUC from initial AUC test calculator: 475 mg·hr/L      -  Comments:   > Minimal concern for renal clearance. Scr WNL near baseline.   > MRSA nares negative, reasonable to maintain staph coverage given presence of abscess, consider de-escalation during stay pending cultures.   > Pharmacy will continue to monitor and adjust as " appropriate.     Margareth Crisostomo, PharmD

## 2025-03-30 NOTE — ASSESSMENT & PLAN NOTE
Abscess does not appear to be associated with a hernia defect, is currently with unclear etiology  Incision and drainage performed by the emergency department provider  Continue IV antibiotics, assess response to therapy, follow-up culture results  Local wound care

## 2025-03-30 NOTE — H&P
Hospital Medicine History & Physical Note    Date of Service  3/30/2025    Primary Care Physician  Brennon Desai M.D.    Consultants  general surgery    Specialist Names: Dr. Multani consulted by ER physician    Code Status  Full Code    Chief Complaint  Chief Complaint   Patient presents with    Other     Px belly button is protruding, red, and edematous. Rash to upper left side of her abdomen as well. Hot to touch.        History of Presenting Illness    Jennifer Monge is a 49 y.o. female with past medical history of hypertension who presented to the hospital on 3/30/2025 with complaint of umbilical redness and swelling.  Patient reported that this started on Wednesday that has been progressively getting worse.  She reported pain and associated chills.  There is no specific aggravating or alleviating factors.  She takes medication for hypertension.  She does not smoke or drink.  She denies any other acute complaints or associated symptoms.    ER course: Patient found to have umbilical abscess and ER physician perform I&D at the bedside I requested ER physician to send abscess for culture and sensitivity.    I discussed about this admission with ER physician Dr. Cruz     I discussed the plan of care with patient.    Review of Systems  Review of Systems   Constitutional:  Positive for malaise/fatigue. Negative for chills, fever and weight loss.   HENT:  Negative for hearing loss and tinnitus.    Eyes:  Negative for blurred vision, double vision, photophobia and pain.   Respiratory:  Negative for cough, sputum production and shortness of breath.    Cardiovascular:  Negative for chest pain, palpitations, orthopnea and leg swelling.   Gastrointestinal:  Positive for abdominal pain. Negative for constipation, diarrhea, nausea and vomiting.   Genitourinary:  Negative for dysuria, frequency and urgency.   Musculoskeletal:  Negative for back pain, joint pain, myalgias and neck pain.   Skin:  Negative for rash.    Neurological:  Negative for dizziness, tingling, tremors, sensory change, speech change, focal weakness and headaches.   Psychiatric/Behavioral:  Negative for hallucinations and substance abuse.    All other systems reviewed and are negative.      Past Medical History   has a past medical history of ASTHMA, Back pain, Back pain, Chest tightness, Chickenpox, Cough, Depression, Difficulty breathing, Frequent headaches, Hypertension, Migraine, Shortness of breath, Snoring, Sweat, sweating, excessive, Wears glasses, and Wheezing.    Surgical History   has a past surgical history that includes tonsillectomy; laser ablation (2012); tubal coagulation laparoscopic bilateral; and sinuscope.     Family History  family history includes Breast Cancer in her maternal aunt; Lung Cancer in her paternal grandmother.   Family history reviewed with patient. There is no family history that is pertinent to the chief complaint.     Social History   reports that she has never smoked. She has never used smokeless tobacco. She reports that she does not drink alcohol and does not use drugs.    Allergies  No Known Allergies    Medications  Prior to Admission Medications   Prescriptions Last Dose Informant Patient Reported? Taking?   Fluticasone-Umeclidin-Vilant (TRELEGY ELLIPTA) 100-62.5-25 MCG/INH AEROSOL POWDER, BREATH ACTIVATED inhalation 3/30/2025 Morning Patient No Yes   Sig: Inhale 1 Inhalation every day.   PARoxetine (PAXIL) 20 MG Tab 3/30/2025 Morning Patient Yes Yes   Sig: Take 20 mg by mouth every day.   albuterol 108 (90 Base) MCG/ACT Aero Soln inhalation aerosol 3/16/2025 Evening Patient No Yes   Sig: Inhale 2 Puffs every four hours as needed for Shortness of Breath.   enalapril (VASOTEC) 5 MG Tab 3/30/2025 Morning Patient Yes Yes   Sig: Take 5 mg by mouth every day.   ibuprofen (MOTRIN) 200 MG Tab 3/30/2025 Morning Patient Yes Yes   Sig: Take 800 mg by mouth every 6 hours as needed for Mild Pain.   montelukast (SINGULAIR) 10  MG Tab 3/30/2025 Morning Patient No Yes   Sig: Take 1 Tablet by mouth at bedtime.   Patient taking differently: Take 10 mg by mouth every day.   ondansetron (ZOFRAN ODT) 4 MG TABLET DISPERSIBLE Not Taking Patient No No   Sig: Take 1 Tablet by mouth every 6 hours as needed (migraine associated nausea).   Patient not taking: Reported on 3/30/2025   pravastatin (PRAVACHOL) 40 MG tablet 3/30/2025 Morning Patient Yes Yes   Sig: Take 40 mg by mouth every day.   topiramate (TOPAMAX) 50 MG tablet 3/30/2025 Morning Patient No Yes   Sig: Take 1 Tablet by mouth every day.   zolmitriptan (ZOMIG) 5 MG nasal solution 2/16/2025 Noon Patient No Yes   Sig: Administer 1 Spray into affected nostril(S) one time as needed for Headache (migraine) for up to 1 dose. Can re-dose in 1-2 hours if no resolution of migraine      Facility-Administered Medications: None       Physical Exam  Temp:  [35.8 °C (96.5 °F)] 35.8 °C (96.5 °F)  Pulse:  [71-85] 79  Resp:  [16-20] 20  BP: (130-139)/(69-80) 131/69  SpO2:  [93 %-97 %] 97 %  Blood Pressure: 131/69   Temperature: 35.8 °C (96.5 °F)   Pulse: 79   Respiration: 20   Pulse Oximetry: 97 %       Physical Exam  Vitals reviewed.   Constitutional:       General: She is not in acute distress.     Appearance: She is obese. She is not ill-appearing.   HENT:      Head: Normocephalic and atraumatic.      Nose: No congestion.   Eyes:      General:         Right eye: No discharge.         Left eye: No discharge.      Pupils: Pupils are equal, round, and reactive to light.   Cardiovascular:      Rate and Rhythm: Normal rate and regular rhythm.      Pulses: Normal pulses.      Heart sounds: Normal heart sounds. No murmur heard.  Pulmonary:      Effort: Pulmonary effort is normal. No respiratory distress.      Breath sounds: Normal breath sounds. No stridor.   Abdominal:      General: Bowel sounds are normal. There is no distension.      Palpations: Abdomen is soft.      Tenderness: There is no abdominal  "tenderness.      Comments: Abdominal wall erythema and wound   Musculoskeletal:         General: No swelling or tenderness. Normal range of motion.      Cervical back: Normal range of motion. No rigidity.   Skin:     General: Skin is warm.      Capillary Refill: Capillary refill takes less than 2 seconds.      Coloration: Skin is not jaundiced or pale.      Findings: No bruising.   Neurological:      General: No focal deficit present.      Mental Status: She is alert and oriented to person, place, and time.      Cranial Nerves: No cranial nerve deficit.      Comments: Following commands and moving all her extremities   Psychiatric:         Mood and Affect: Mood normal.         Behavior: Behavior normal.         Laboratory:  Recent Labs     03/30/25  1316   WBC 11.4*   RBC 4.72   HEMOGLOBIN 15.1   HEMATOCRIT 45.1   MCV 95.6   MCH 32.0   MCHC 33.5   RDW 45.1   PLATELETCT 333   MPV 9.3     Recent Labs     03/30/25  1316   SODIUM 139   POTASSIUM 4.3   CHLORIDE 107   CO2 20   GLUCOSE 92   BUN 10   CREATININE 0.76   CALCIUM 9.5     Recent Labs     03/30/25  1316   ALTSGPT 20   ASTSGOT 21   ALKPHOSPHAT 156*   TBILIRUBIN 0.3   GLUCOSE 92         No results for input(s): \"NTPROBNP\" in the last 72 hours.      No results for input(s): \"TROPONINT\" in the last 72 hours.    Imaging:  CT-ABDOMEN-PELVIS WITH   Final Result      1. Umbilical abscess measuring 3.3 x 2.4 x 2.5 cm in diameter.   2. No hernia or communication with the peritoneal cavity.   3. Cholelithiasis.   4. Uterine fibroid.   5. Atherosclerosis.   6. Multiple hypodense lesions in the liver and spleen, most compatible with cysts or hemangiomata.            Assessment/Plan:  Justification for Admission Status  I anticipate this patient will require at least two midnights for appropriate medical management, necessitating inpatient admission because for abdominal wall abscess that required I&D.  Started on broad-spectrum antibiotic and pending culture " results.    Patient will need a Med/Surg bed on MEDICAL service .  The need is secondary to abdominal wall abscess.    * Abscess, umbilical- (present on admission)  Assessment & Plan  Patient found to have umbilical abscess.  ER physician performed I&D at the bedside and sent for Gram stain and culture.  Started on broad-spectrum antibiotic.  Continue vancomycin and dose adjustment as per vancomycin trough level and monitor for vancomycin toxicity  Continue provider pain control   I discussed plan of care with her.      Leukocytosis  Assessment & Plan  Most likely secondary to umbilical abscess   Started on broad-spectrum antibiotics  Ordered CBC for tomorrow.    Depression- (present on admission)  Assessment & Plan  Continue patient medication.    Migraine with aura and without status migrainosus, not intractable- (present on admission)  Assessment & Plan  No symptoms of acute headache.  Continue to monitor.    HTN (hypertension)- (present on admission)  Assessment & Plan  Continue outpatient medications  Order IV hydralazine as needed with parameters.      I discussed about this admission with ER physician Dr. Cruz     VTE prophylaxis: enoxaparin ppx

## 2025-03-30 NOTE — ED NOTES
Pharmacy Medication Reconciliation      ~Medication reconciliation updated and complete per patient   ~Allergies have been verified and updated   ~No oral ABX within the last 30 days  ~Is dispense history available in EPIC: no  ~Patient home pharmacy :  [facility] [phone]      ~Anticoagulants (rivaroxaban, apixaban, edoxaban, dabigatran, warfarin, enoxaparin) taken in the last 14 days? No

## 2025-03-30 NOTE — ED NOTES
20 G L AC infiltrated with NS infusion and Zosyn infusing, pt reports pain at PIV site, no redness slight swelling, PIV removed with catheter intact bleeding controlled gauze and tape applied pt tolerated well. ERP shown PIV site, pharmacy consulted and stated Ice application was treatment, pt refused ice application stating she no longer had pain. Primary RN aware.

## 2025-03-30 NOTE — CONSULTS
DATE OF CONSULTATION:  3/30/2025     REFERRING PHYSICIAN:   Lisandro Cruz M.D.     CONSULTING PHYSICIAN:  Rachael Multani M.D.     REASON FOR CONSULTATION:  I have been asked by Dr. Cruz to see the patient in surgical consultation for evaluation of periumbilical erythema, pain.    HISTORY OF PRESENT ILLNESS: The patient is a 49 year-old White woman who presents to the Emergency Department with a 4-day history of moderate periumbilical abdominal pain. The pain is associated with  redness .  The patient initially thought that she had an umbilical hernia, and felt that the contents were initially reducible.  She made an appointment with general surgery, but her pain continued to worsen prior to her appointment, prompting her presentation to the emergency department today.  She denies any symptoms of obstipation, no nausea, emesis, she is able to pass flatus and have bowel movements.  She has noticed redness around the umbilicus, and her previously concave umbilicus is now flat in appearance.    The patient has a history of an abdominoplasty approximately 2 and half years ago, during which time her umbilicus was relocated.  She was told that an umbilical hernia was repaired at the time of her surgery.    PAST MEDICAL HISTORY:  has a past medical history of ASTHMA, Back pain, Back pain, Chest tightness, Chickenpox, Cough, Depression, Difficulty breathing, Frequent headaches, Hypertension, Migraine, Shortness of breath, Snoring, Sweat, sweating, excessive, Wears glasses, and Wheezing.    PAST SURGICAL HISTORY:  has a past surgical history that includes tonsillectomy; laser ablation (2012); tubal coagulation laparoscopic bilateral; and sinuscope.  Abdominoplasty, bilateral breast lift.    ALLERGIES: No Known Allergies    CURRENT MEDICATIONS:    Home Medications       Reviewed by Antonio García (Pharmacy Tech) on 03/30/25 at 1512  Med List Status: Complete     Medication Last Dose Status   albuterol 108  (90 Base) MCG/ACT Aero Soln inhalation aerosol 3/16/2025 Active   enalapril (VASOTEC) 5 MG Tab 3/30/2025 Active   Fluticasone-Umeclidin-Vilant (TRELEGY ELLIPTA) 100-62.5-25 MCG/INH AEROSOL POWDER, BREATH ACTIVATED inhalation 3/30/2025 Active   ibuprofen (MOTRIN) 200 MG Tab 3/30/2025 Active   montelukast (SINGULAIR) 10 MG Tab 3/30/2025 Active   ondansetron (ZOFRAN ODT) 4 MG TABLET DISPERSIBLE Not Taking Active   PARoxetine (PAXIL) 20 MG Tab 3/30/2025 Active   pravastatin (PRAVACHOL) 40 MG tablet 3/30/2025 Active   topiramate (TOPAMAX) 50 MG tablet 3/30/2025 Active   zolmitriptan (ZOMIG) 5 MG nasal solution 2/16/2025 Active                  Audit from Redirected Encounters    **Home medications have not yet been reviewed for this encounter**         FAMILY HISTORY: family history includes Breast Cancer in her maternal aunt; Lung Cancer in her paternal grandmother.    SOCIAL HISTORY:  reports that she has never smoked. She has never used smokeless tobacco. She reports that she does not drink alcohol and does not use drugs.    REVIEW OF SYSTEMS: Comprehensive review of systems is negative with the exception of the aforementioned HPI, PMH, and PSH bullets in accordance with CMS guidelines.    PHYSICAL EXAMINATION:    CONSTITUTIONAL: Alert, awake, oriented x3.  HEENT: Normocephalic, atraumatic. PERRL. Conjunctivae normal.  NECK: Supple  CARDIOVASCULAR: Normal rate, regular rhythm.  PULMONARY/CHEST: No respiratory distress. Chest wall stable, non-tender, normal excursion.  ABDOMEN: Soft, non-distended, mildly tender to palpation in the area of the umbilicus.  Thinning of the skin of the umbilicus with periumbilical erythema extending towards the left upper quadrant.  Well-healed abdominoplasty scar.  MUSCULOSKELETAL: Moving all extremities.  NEUROLOGICAL: CNII-XII grossly intact, no focal deficits.  SKIN: Warm and dry. No abrasions, lacerations.  PSYCHIATRIC: Behavior appropriate for situation.    LABORATORY VALUES:    Recent Labs     03/30/25  1316   WBC 11.4*   RBC 4.72   HEMOGLOBIN 15.1   HEMATOCRIT 45.1   MCV 95.6   MCH 32.0   MCHC 33.5   RDW 45.1   PLATELETCT 333   MPV 9.3     Recent Labs     03/30/25  1316   SODIUM 139   POTASSIUM 4.3   CHLORIDE 107   CO2 20   GLUCOSE 92   BUN 10   CREATININE 0.76   CALCIUM 9.5     Recent Labs     03/30/25  1316   ASTSGOT 21   ALTSGPT 20   TBILIRUBIN 0.3   ALKPHOSPHAT 156*   GLOBULIN 3.0            IMAGING:   CT-ABDOMEN-PELVIS WITH   Final Result      1. Umbilical abscess measuring 3.3 x 2.4 x 2.5 cm in diameter.   2. No hernia or communication with the peritoneal cavity.   3. Cholelithiasis.   4. Uterine fibroid.   5. Atherosclerosis.   6. Multiple hypodense lesions in the liver and spleen, most compatible with cysts or hemangiomata.          ASSESSMENT AND PLAN:   This is a 49-year-old female with a history of prior abdominoplasty and umbilical hernia repair, presenting with periumbilical erythema, superficial soft tissue abscess without evidence of underlying ventral hernia.    * Abscess, umbilical- (present on admission)  Assessment & Plan  Abscess does not appear to be associated with a hernia defect, is currently with unclear etiology  Incision and drainage performed by the emergency department provider  Continue IV antibiotics, assess response to therapy, follow-up culture results  Local wound care      DISPOSITION: Medical evaluation and admission for IV antibiotics. [facility] Acute Care Surgery Blue Service will follow.     ____________________________________     Rahcael Multani M.D.    DD: 3/30/2025  4:16 PM    AAST Grading System for EGS Conditions  ACS NSQIP Surgical Risk Calculator

## 2025-03-30 NOTE — ED NOTES
Pt back from CT scan. Pt has call light within reach, bed locked in lowest position side rail up x 2, family bedside. Pt on HR, oxygen, cardiac, and BP monitor.

## 2025-03-31 VITALS
HEART RATE: 89 BPM | DIASTOLIC BLOOD PRESSURE: 77 MMHG | BODY MASS INDEX: 30.96 KG/M2 | TEMPERATURE: 97.1 F | HEIGHT: 65 IN | WEIGHT: 185.85 LBS | OXYGEN SATURATION: 94 % | RESPIRATION RATE: 17 BRPM | SYSTOLIC BLOOD PRESSURE: 127 MMHG

## 2025-03-31 LAB
ALBUMIN SERPL BCP-MCNC: 3.7 G/DL (ref 3.2–4.9)
ALBUMIN/GLOB SERPL: 1.5 G/DL
ALP SERPL-CCNC: 133 U/L (ref 30–99)
ALT SERPL-CCNC: 12 U/L (ref 2–50)
ANION GAP SERPL CALC-SCNC: 10 MMOL/L (ref 7–16)
AST SERPL-CCNC: 18 U/L (ref 12–45)
BACTERIA WND AEROBE CULT: NORMAL
BASOPHILS # BLD AUTO: 0.6 % (ref 0–1.8)
BASOPHILS # BLD: 0.06 K/UL (ref 0–0.12)
BILIRUB SERPL-MCNC: 0.3 MG/DL (ref 0.1–1.5)
BUN SERPL-MCNC: 8 MG/DL (ref 8–22)
CALCIUM ALBUM COR SERPL-MCNC: 8.5 MG/DL (ref 8.5–10.5)
CALCIUM SERPL-MCNC: 8.3 MG/DL (ref 8.5–10.5)
CHLORIDE SERPL-SCNC: 107 MMOL/L (ref 96–112)
CO2 SERPL-SCNC: 21 MMOL/L (ref 20–33)
CREAT SERPL-MCNC: 0.8 MG/DL (ref 0.5–1.4)
EOSINOPHIL # BLD AUTO: 0.5 K/UL (ref 0–0.51)
EOSINOPHIL NFR BLD: 4.6 % (ref 0–6.9)
ERYTHROCYTE [DISTWIDTH] IN BLOOD BY AUTOMATED COUNT: 46.5 FL (ref 35.9–50)
GFR SERPLBLD CREATININE-BSD FMLA CKD-EPI: 89 ML/MIN/1.73 M 2
GLOBULIN SER CALC-MCNC: 2.5 G/DL (ref 1.9–3.5)
GLUCOSE SERPL-MCNC: 127 MG/DL (ref 65–99)
GRAM STN SPEC: NORMAL
HCT VFR BLD AUTO: 39.8 % (ref 37–47)
HGB BLD-MCNC: 13.5 G/DL (ref 12–16)
IMM GRANULOCYTES # BLD AUTO: 0.07 K/UL (ref 0–0.11)
IMM GRANULOCYTES NFR BLD AUTO: 0.6 % (ref 0–0.9)
LYMPHOCYTES # BLD AUTO: 2.03 K/UL (ref 1–4.8)
LYMPHOCYTES NFR BLD: 18.8 % (ref 22–41)
MAGNESIUM SERPL-MCNC: 1.9 MG/DL (ref 1.5–2.5)
MCH RBC QN AUTO: 32.6 PG (ref 27–33)
MCHC RBC AUTO-ENTMCNC: 33.9 G/DL (ref 32.2–35.5)
MCV RBC AUTO: 96.1 FL (ref 81.4–97.8)
MONOCYTES # BLD AUTO: 0.77 K/UL (ref 0–0.85)
MONOCYTES NFR BLD AUTO: 7.1 % (ref 0–13.4)
NEUTROPHILS # BLD AUTO: 7.34 K/UL (ref 1.82–7.42)
NEUTROPHILS NFR BLD: 68.3 % (ref 44–72)
NRBC # BLD AUTO: 0 K/UL
NRBC BLD-RTO: 0 /100 WBC (ref 0–0.2)
PLATELET # BLD AUTO: 309 K/UL (ref 164–446)
PMV BLD AUTO: 9.4 FL (ref 9–12.9)
POTASSIUM SERPL-SCNC: 3.8 MMOL/L (ref 3.6–5.5)
PROT SERPL-MCNC: 6.2 G/DL (ref 6–8.2)
RBC # BLD AUTO: 4.14 M/UL (ref 4.2–5.4)
SIGNIFICANT IND 70042: NORMAL
SITE SITE: NORMAL
SODIUM SERPL-SCNC: 138 MMOL/L (ref 135–145)
SOURCE SOURCE: NORMAL
WBC # BLD AUTO: 10.8 K/UL (ref 4.8–10.8)

## 2025-03-31 PROCEDURE — A9270 NON-COVERED ITEM OR SERVICE: HCPCS | Performed by: INTERNAL MEDICINE

## 2025-03-31 PROCEDURE — 97602 WOUND(S) CARE NON-SELECTIVE: CPT

## 2025-03-31 PROCEDURE — 99232 SBSQ HOSP IP/OBS MODERATE 35: CPT | Performed by: STUDENT IN AN ORGANIZED HEALTH CARE EDUCATION/TRAINING PROGRAM

## 2025-03-31 PROCEDURE — 700105 HCHG RX REV CODE 258: Performed by: INTERNAL MEDICINE

## 2025-03-31 PROCEDURE — 85025 COMPLETE CBC W/AUTO DIFF WBC: CPT

## 2025-03-31 PROCEDURE — 80053 COMPREHEN METABOLIC PANEL: CPT

## 2025-03-31 PROCEDURE — 83735 ASSAY OF MAGNESIUM: CPT

## 2025-03-31 PROCEDURE — 99231 SBSQ HOSP IP/OBS SF/LOW 25: CPT | Performed by: SURGERY

## 2025-03-31 PROCEDURE — 700111 HCHG RX REV CODE 636 W/ 250 OVERRIDE (IP): Mod: JZ | Performed by: INTERNAL MEDICINE

## 2025-03-31 PROCEDURE — 700101 HCHG RX REV CODE 250: Performed by: STUDENT IN AN ORGANIZED HEALTH CARE EDUCATION/TRAINING PROGRAM

## 2025-03-31 PROCEDURE — 700102 HCHG RX REV CODE 250 W/ 637 OVERRIDE(OP): Performed by: INTERNAL MEDICINE

## 2025-03-31 PROCEDURE — 770006 HCHG ROOM/CARE - MED/SURG/GYN SEMI*

## 2025-03-31 PROCEDURE — 36415 COLL VENOUS BLD VENIPUNCTURE: CPT

## 2025-03-31 RX ORDER — SODIUM HYPOCHLORITE 1.25 MG/ML
SOLUTION TOPICAL 2 TIMES DAILY
Status: DISCONTINUED | OUTPATIENT
Start: 2025-03-31 | End: 2025-04-02 | Stop reason: HOSPADM

## 2025-03-31 RX ADMIN — VANCOMYCIN HYDROCHLORIDE 1250 MG: 5 INJECTION, POWDER, LYOPHILIZED, FOR SOLUTION INTRAVENOUS at 03:00

## 2025-03-31 RX ADMIN — DAKIN'S SOLUTION 0.125% (QUARTER STRENGTH) 10 ML: 0.12 SOLUTION at 18:00

## 2025-03-31 RX ADMIN — ENOXAPARIN SODIUM 40 MG: 100 INJECTION SUBCUTANEOUS at 16:51

## 2025-03-31 RX ADMIN — FLUTICASONE FUROATE, UMECLIDINIUM BROMIDE AND VILANTEROL TRIFENATATE 1 PUFF: 100; 62.5; 25 POWDER RESPIRATORY (INHALATION) at 05:00

## 2025-03-31 RX ADMIN — AMPICILLIN AND SULBACTAM 3 G: 1; 2 INJECTION, POWDER, FOR SOLUTION INTRAMUSCULAR; INTRAVENOUS at 05:08

## 2025-03-31 RX ADMIN — ENALAPRIL MALEATE 5 MG: 2.5 TABLET ORAL at 05:00

## 2025-03-31 RX ADMIN — AMPICILLIN AND SULBACTAM 3 G: 1; 2 INJECTION, POWDER, FOR SOLUTION INTRAMUSCULAR; INTRAVENOUS at 23:27

## 2025-03-31 RX ADMIN — VANCOMYCIN HYDROCHLORIDE 1250 MG: 5 INJECTION, POWDER, LYOPHILIZED, FOR SOLUTION INTRAVENOUS at 14:23

## 2025-03-31 RX ADMIN — SODIUM CHLORIDE, POTASSIUM CHLORIDE, SODIUM LACTATE AND CALCIUM CHLORIDE: 600; 310; 30; 20 INJECTION, SOLUTION INTRAVENOUS at 11:58

## 2025-03-31 RX ADMIN — OXYCODONE HYDROCHLORIDE 5 MG: 5 TABLET ORAL at 05:00

## 2025-03-31 RX ADMIN — AMPICILLIN AND SULBACTAM 3 G: 1; 2 INJECTION, POWDER, FOR SOLUTION INTRAMUSCULAR; INTRAVENOUS at 12:03

## 2025-03-31 RX ADMIN — MONTELUKAST 10 MG: 10 TABLET, FILM COATED ORAL at 05:00

## 2025-03-31 RX ADMIN — OXYCODONE HYDROCHLORIDE 5 MG: 5 TABLET ORAL at 16:49

## 2025-03-31 RX ADMIN — OXYCODONE HYDROCHLORIDE 5 MG: 5 TABLET ORAL at 11:54

## 2025-03-31 RX ADMIN — AMPICILLIN AND SULBACTAM 3 G: 1; 2 INJECTION, POWDER, FOR SOLUTION INTRAMUSCULAR; INTRAVENOUS at 16:54

## 2025-03-31 RX ADMIN — DAKIN'S SOLUTION 0.125% (QUARTER STRENGTH) 473 ML: 0.12 SOLUTION at 10:15

## 2025-03-31 RX ADMIN — PRAVASTATIN SODIUM 40 MG: 20 TABLET ORAL at 05:00

## 2025-03-31 RX ADMIN — TOPIRAMATE 50 MG: 25 TABLET, FILM COATED ORAL at 05:00

## 2025-03-31 RX ADMIN — PAROXETINE HYDROCHLORIDE 20 MG: 20 TABLET, FILM COATED ORAL at 05:00

## 2025-03-31 ASSESSMENT — ENCOUNTER SYMPTOMS
NAUSEA: 0
FEVER: 0
VOMITING: 0
ABDOMINAL PAIN: 0
SHORTNESS OF BREATH: 0

## 2025-03-31 ASSESSMENT — PAIN DESCRIPTION - PAIN TYPE
TYPE: ACUTE PAIN

## 2025-03-31 NOTE — ASSESSMENT & PLAN NOTE
No trauma or wounds previously  S/p I&D by EDP  - Follow wound cultures  - Continue IV vanco and unaysn today  - Wound care consult placed  - Pain management as requested

## 2025-03-31 NOTE — CARE PLAN
The patient is Stable - Low risk of patient condition declining or worsening    Shift Goals  Clinical Goals: Pt will report pain as below or equal to 5 out of 10 after interventions during this shift.  Patient Goals: Comfort, sleep  Family Goals: Not at bedside    Pt pleasant and cooperative. Abx continued. Bed in locked, lowest position. Pain well-managed.    Progress made toward(s) clinical / shift goals:    Problem: Knowledge Deficit - Standard  Goal: Patient and family/care givers will demonstrate understanding of plan of care, disease process/condition, diagnostic tests and medications  Outcome: Progressing     Problem: Pain - Standard  Goal: Alleviation of pain or a reduction in pain to the patient’s comfort goal  Outcome: Progressing     Problem: Safety  Goal: Will remain free from injury  Reactivated     Problem: Safety  Goal: Will remain free from falls  Reactivated       Patient is not progressing towards the following goals:

## 2025-03-31 NOTE — PROGRESS NOTES
"  DATE: 3/31/2025    INTERVAL EVENTS:  Incision and drainage of umbilical abscess.  Parenteral antibiotic therapy.    PHYSICAL EXAMINATION:  Vital Signs: /63   Pulse 67   Temp 36.4 °C (97.5 °F) (Temporal)   Resp 16   Ht 1.651 m (5' 5\")   Wt 84.3 kg (185 lb 13.6 oz)   SpO2 95%     Feeling well.  No complaints.  Abscess drained.    LABORATORY VALUES:  Recent Labs     03/30/25  1316 03/31/25  0136   WBC 11.4* 10.8   RBC 4.72 4.14*   HEMOGLOBIN 15.1 13.5   HEMATOCRIT 45.1 39.8   MCV 95.6 96.1   MCH 32.0 32.6   MCHC 33.5 33.9   RDW 45.1 46.5   PLATELETCT 333 309   MPV 9.3 9.4       ASSESSMENT AND PLAN:  Satisfactory progress following simple drainage of umbilical abscess.  No evidence for associated hernia.  Reasonable to transition to oral antibiotic therapy.  No further surgical intervention warranted.  Follow-up in the acute care surgery clinic as needed.  [facility] Blue Service signing off.         ____________________________________     Jesse Rutledge M.D.    DD: 3/31/2025  10:41 AM    "

## 2025-03-31 NOTE — PROGRESS NOTES
4 Eyes Skin Assessment Completed by MALENA Machuca and MALENA Albert.    Head Scab R Cheek  Ears Redness and Blanching  Nose WDL  Mouth WDL  Neck WDL  Breast/Chest Scar  Shoulder Blades WDL  Spine WDL  (R) Arm/Elbow/Hand WDL  (L) Arm/Elbow/Hand WDL  Abdomen Scar lower pannus; incision with dressing CDI  Groin WDL  Scrotum/Coccyx/Buttocks WDL  (R) Leg WDL  (L) Leg WDL  (R) Heel/Foot/Toe Dry heels  (L) Heel/Foot/Toe Dry heels          Devices In Places R PIV      Interventions In Place Pillows    Possible Skin Injury No    Pictures Uploaded Into Epic N/A  Wound Consult Placed N/A  RN Wound Prevention Protocol Ordered No

## 2025-03-31 NOTE — HOSPITAL COURSE
Jennifer Monge is a 49-year-old female with PMHx HTN.  Admitted 3/30 for umbilical redness and swelling.    Per history-patient noted pain and swelling to her umbilicus on 3/26.  This has been associated with chills.  No recent surgery or trauma to the area.    In the ED: Vitals are stable.  CT A/P: Umbilical abscess measuring 3.3 x 2.4 x 2.5 cm in diameter.  No hernia or communication with peritoneal cavity.  I&D was performed by EDP.  Wound cultures pending.  Started on IV Unasyn and vancomycin.

## 2025-03-31 NOTE — WOUND TEAM
Assisted Wound RN Dre with skin assessment and wound consult evaluation for pressure injury.  Additional staff necessary for turning/standing from chair, repositioning patient, assisting with dressing application and supplies and determining progress, assessment and staging of pressure injuries and/or complicated wound care.

## 2025-03-31 NOTE — PROGRESS NOTES
[facility] Medicine Daily Progress Note    Date of Service  3/31/2025    Chief Complaint  Jennifer Monge is a 49 y.o. female admitted 3/30/2025 with umbilical pain    Hospital Course  Jennifer Monge is a 49-year-old female with PMHx HTN.  Admitted 3/30 for umbilical redness and swelling.    Per history-patient noted pain and swelling to her umbilicus on 3/26.  This has been associated with chills.  No recent surgery or trauma to the area.    In the ED: Vitals are stable.  CT A/P: Umbilical abscess measuring 3.3 x 2.4 x 2.5 cm in diameter.  No hernia or communication with peritoneal cavity.  I&D was performed by EDP.  Wound cultures pending.  Started on IV Unasyn and vancomycin.    Interval Problem Update  3/31: Patient remains afebrile.   through 140.  WBC 10.8 from 11.4.  Chemistry panel normal.  Wound cultures NGTD. Continue IV vanco and unasyn today. Follow culture results and de-escalate as able.    I have discussed this patient's plan of care and discharge plan at IDT rounds today with Case Management, Nursing, Nursing leadership, and other members of the IDT team.    Consultants/Specialty  Wound care     Code Status  Full Code    Disposition  The patient is not medically cleared for discharge to home or a post-acute facility.  Anticipate discharge to: home with close outpatient follow-up    I have placed the appropriate orders for post-discharge needs.    Review of Systems  Review of Systems   Constitutional:  Negative for fever and malaise/fatigue.   Respiratory:  Negative for shortness of breath.    Cardiovascular:  Negative for chest pain and leg swelling.   Gastrointestinal:  Negative for abdominal pain, nausea and vomiting.        Physical Exam  Temp:  [35.8 °C (96.5 °F)-37.2 °C (98.9 °F)] 36.4 °C (97.5 °F)  Pulse:  [67-88] 67  Resp:  [16-20] 16  BP: (105-140)/(63-88) 105/63  SpO2:  [93 %-97 %] 95 %    Physical Exam  Vitals and nursing note reviewed.   Constitutional:       General: She is not in  acute distress.     Appearance: Normal appearance. She is not ill-appearing.   Cardiovascular:      Rate and Rhythm: Normal rate and regular rhythm.   Pulmonary:      Effort: Pulmonary effort is normal.      Breath sounds: Normal breath sounds.   Abdominal:      General: Bowel sounds are normal. There is no distension.      Palpations: Abdomen is soft.      Tenderness: There is abdominal tenderness.      Comments: Small wound to umbilicus with significant surrounding erythema; packing present    Skin:     General: Skin is warm and dry.   Neurological:      Mental Status: She is alert and oriented to person, place, and time.   Psychiatric:         Mood and Affect: Mood normal.         Behavior: Behavior normal.         Fluids    Intake/Output Summary (Last 24 hours) at 3/31/2025 1040  Last data filed at 3/30/2025 1724  Gross per 24 hour   Intake 220 ml   Output --   Net 220 ml        Laboratory  Recent Labs     03/30/25  1316 03/31/25  0136   WBC 11.4* 10.8   RBC 4.72 4.14*   HEMOGLOBIN 15.1 13.5   HEMATOCRIT 45.1 39.8   MCV 95.6 96.1   MCH 32.0 32.6   MCHC 33.5 33.9   RDW 45.1 46.5   PLATELETCT 333 309   MPV 9.3 9.4     Recent Labs     03/30/25  1316 03/31/25  0136   SODIUM 139 138   POTASSIUM 4.3 3.8   CHLORIDE 107 107   CO2 20 21   GLUCOSE 92 127*   BUN 10 8   CREATININE 0.76 0.80   CALCIUM 9.5 8.3*                   Imaging  CT-ABDOMEN-PELVIS WITH   Final Result      1. Umbilical abscess measuring 3.3 x 2.4 x 2.5 cm in diameter.   2. No hernia or communication with the peritoneal cavity.   3. Cholelithiasis.   4. Uterine fibroid.   5. Atherosclerosis.   6. Multiple hypodense lesions in the liver and spleen, most compatible with cysts or hemangiomata.           Assessment/Plan  * Abscess, umbilical- (present on admission)  Assessment & Plan  No trauma or wounds previously  S/p I&D by EDP  - Follow wound cultures  - Continue IV vanco and unaysn today  - Wound care consult placed  - Pain management as requested      Leukocytosis  Assessment & Plan  WBC 11.4 --> 10.8  Most likely secondary to umbilical abscess   - Repeat CBC in AM    Depression- (present on admission)  Assessment & Plan  Continue home paroxetine     Migraine with aura and without status migrainosus, not intractable- (present on admission)  Assessment & Plan  No symptoms of acute headache.  Continue to monitor.    HTN (hypertension)- (present on admission)  Assessment & Plan  SBP ranging 137-140  Continue home enalapril  Order IV hydralazine as needed with parameters.         VTE prophylaxis:   SCDs/TEDs   enoxaparin ppx      I have performed a physical exam and reviewed and updated ROS and Plan today (3/31/2025). In review of yesterday's note (3/30/2025), there are no changes except as documented above.

## 2025-03-31 NOTE — WOUND TEAM
[facility]  Inpatient Services  Initial Wound and Skin Care Evaluation    Admission Date: 3/30/2025     Last order of IP CONSULT TO WOUND CARE was found on 3/31/2025 from Hospital Encounter on 3/30/2025     HPI, PMH, SH: Reviewed    Past Surgical History:   Procedure Laterality Date    LASER ABLATION  2012    SINUSCOPE      TONSILLECTOMY      age 11    TUBAL COAGULATION LAPAROSCOPIC BILATERAL       Social History     Tobacco Use    Smoking status: Never    Smokeless tobacco: Never   Substance Use Topics    Alcohol use: No     Chief Complaint   Patient presents with    Other     Px belly button is protruding, red, and edematous. Rash to upper left side of her abdomen as well. Hot to touch.      Diagnosis: Abscess, umbilical [L02.216]    Unit where seen by Wound Team: S615/01     WOUND CONSULT RELATED TO:  Umbilicus    WOUND TEAM PLAN OF CARE - Frequency of Follow-up:   Nursing to follow dressing orders written for wound care. Contact wound team if area fails to progress, deteriorates or with any questions/concerns if something comes up before next scheduled follow up (See below as to whether wound is following and frequency of wound follow up)   Weekly - Umbilicus    WOUND HISTORY:   Pt is a 49yr female with past medical history of HTN who presented with complaints of umbilical redness and swelling. Pt states it started last Wednesday and she called for an appointment with [facility]. Unfortunately pt deteriorated with rash to upper left side and therefore presented to the ER. The abscess was opened in the ER and surgery was consulted who felt further surgical intervention was not required. Surgery recommended continued antibiotics and local wound care, therefore wound team was consulted.        WOUND ASSESSMENT/LDA  Wound 03/30/25 Full Thickness Wound Umbilicus Anterior (Active)   Date First Assessed/Time First Assessed: 03/30/25 1600   Hand Hygiene Completed: Yes  Primary Wound Type:  Full Thickness Wound  Location: Umbilicus  Wound Orientation: Anterior  Wound Outcome: (c) Other (Comment)      Assessments 3/31/2025 12:00 PM   Wound Image       Site Assessment Red;Drainage   Periwound Assessment Clean;Red;Warm   Margins Attached edges;Defined edges   Closure Adhesive bandage   Drainage Amount Small   Drainage Description Serosanguineous;Tan;Thick   Treatments Nonselective debridement;Site care;Cleansed;Chemical debridement   Wound Cleansing Dakin's Solution   Periwound Protectant No-sting Skin Prep   Dressing Status Clean;Dry;Intact   Dressing Changed New   Dressing Cleansing/Solutions 1/4 Strength Dakin's Solution   Dressing Options Plain Strip Packing;Silicone Adhesive Foam   Dressing Change/Treatment Frequency Every Shift, and As Needed   NEXT Dressing Change/Treatment Date 03/31/25   NEXT Weekly Photo (Inpatient Only) 04/07/25   Wound Team Following Weekly   Non-staged Wound Description Full thickness   Wound Length (cm) 0.5 cm   Wound Width (cm) 1 cm   Wound Depth (cm) 3.5 cm   Wound Surface Area (cm^2) 0.5 cm^2   Wound Volume (cm^3) 1.75 cm^3   Shape Irregular circular with skin bridge   WOUND NURSE ONLY - Time Spent with Patient (mins) 60        Vascular:    ANDREAS:   No results found.    Lab Values:    Lab Results   Component Value Date/Time    WBC 10.8 03/31/2025 01:36 AM    RBC 4.14 (L) 03/31/2025 01:36 AM    HEMOGLOBIN 13.5 03/31/2025 01:36 AM    HEMATOCRIT 39.8 03/31/2025 01:36 AM         Culture Results show:  No results found for this or any previous visit (from the past 720 hours).    Pain Level/Medicated:  None, Tolerated without pain medication       INTERVENTIONS BY WOUND TEAM:  Chart and images reviewed. Discussed with bedside RN. All areas of concern (based on picture review, LDA review and discussion with bedside RN) have been thoroughly assessed. Documentation of areas based on significant findings. This RN in to assess patient. Performed standard wound care which includes  "appropriate positioning, dressing removal and non-selective debridement. Pictures and measurements obtained weekly if/when required.    Wound:  Umbilicus Full thickness abscess  Preparation for Dressing removal: Removed without difficulty  Cleansed/Non-selectively Debrided with:  Wound cleanser and Gauze  Zaina wound: Cleansed with Wound cleanser and Gauze, Prepped with No Sting  Primary Dressing:  dakins moistened 1/4\" plain strip packing was packed into wound  Secondary (Outer) Dressing: Secured with an adhesive foam    Advanced Wound Care Discharge Planning  Number of Clinicians necessary to complete wound care: 1  Is patient requiring IV pain medications for dressing changes:  No   Length of time for dressing change 30 min. (This does not include chart review, pre-medication time, set up, clean up or time spent charting.)    Interdisciplinary consultation: Patient, Bedside RN (Brigette), Alice FRAGOSO (Wound RN).  Pressure injury and staging reviewed with N/A.    EVALUATION / RATIONALE FOR TREATMENT:     Date:  03/31/25  Wound Status:  Initial evaluation    Pt with open wound to the umbilicus. Dakins strip packing applied and ordered. Orders placed for silver strip packing in one week to transition. Instructions placed in the DC navigator. Order placed for outpatient wound clinic. Pt states herself or her  can change dressing daily.         Goals: Steady decrease in wound area and depth weekly.    NURSING PLAN OF CARE ORDERS:  Dressing changes: See Dressing Care orders    NUTRITION RECOMMENDATIONS   Wound Team Recommendations:  N/A    DIET ORDERS (From admission to next 24h)       Start     Ordered    03/30/25 1532  Diet Order Diet: Regular  ALL MEALS        Question:  Diet:  Answer:  Regular    03/30/25 1532                  Anticipated discharge plans:  Outpatient Wound Center        Vac Discharge Needs:  Vac Discharge plan is purely a recommendation from wound team and not a requirement for discharge unless " otherwise stated by physician.  Not Applicable Pt not on a wound vac

## 2025-03-31 NOTE — CARE PLAN
The patient is Watcher - Medium risk of patient condition declining or worsening    Shift Goals  Clinical Goals: Pt will be free from s/s of infection throughout shift  Patient Goals: Maintain comfort throughout shift  Family Goals: REBECCA    Progress made toward(s) clinical / shift goals:  Jennifer is tolerating her antibiotics. She remains afebrile. Her abdomen is free from rash and she is tolerating her wound care.     Patient is not progressing towards the following goals:

## 2025-04-01 LAB
ERYTHROCYTE [DISTWIDTH] IN BLOOD BY AUTOMATED COUNT: 47.8 FL (ref 35.9–50)
HCT VFR BLD AUTO: 40.8 % (ref 37–47)
HGB BLD-MCNC: 13.1 G/DL (ref 12–16)
MCH RBC QN AUTO: 32.1 PG (ref 27–33)
MCHC RBC AUTO-ENTMCNC: 32.1 G/DL (ref 32.2–35.5)
MCV RBC AUTO: 100 FL (ref 81.4–97.8)
PLATELET # BLD AUTO: 259 K/UL (ref 164–446)
PMV BLD AUTO: 9.7 FL (ref 9–12.9)
RBC # BLD AUTO: 4.08 M/UL (ref 4.2–5.4)
VANCOMYCIN PEAK SERPL-MCNC: 31.7 UG/ML (ref 20–40)
WBC # BLD AUTO: 6.5 K/UL (ref 4.8–10.8)

## 2025-04-01 PROCEDURE — 700111 HCHG RX REV CODE 636 W/ 250 OVERRIDE (IP): Mod: JZ | Performed by: INTERNAL MEDICINE

## 2025-04-01 PROCEDURE — 700105 HCHG RX REV CODE 258: Performed by: INTERNAL MEDICINE

## 2025-04-01 PROCEDURE — A9270 NON-COVERED ITEM OR SERVICE: HCPCS | Performed by: INTERNAL MEDICINE

## 2025-04-01 PROCEDURE — 85027 COMPLETE CBC AUTOMATED: CPT

## 2025-04-01 PROCEDURE — 80202 ASSAY OF VANCOMYCIN: CPT

## 2025-04-01 PROCEDURE — 770006 HCHG ROOM/CARE - MED/SURG/GYN SEMI*

## 2025-04-01 PROCEDURE — 99233 SBSQ HOSP IP/OBS HIGH 50: CPT | Performed by: HOSPITALIST

## 2025-04-01 PROCEDURE — 700102 HCHG RX REV CODE 250 W/ 637 OVERRIDE(OP): Performed by: INTERNAL MEDICINE

## 2025-04-01 RX ADMIN — AMPICILLIN AND SULBACTAM 3 G: 1; 2 INJECTION, POWDER, FOR SOLUTION INTRAMUSCULAR; INTRAVENOUS at 17:58

## 2025-04-01 RX ADMIN — AMPICILLIN AND SULBACTAM 3 G: 1; 2 INJECTION, POWDER, FOR SOLUTION INTRAMUSCULAR; INTRAVENOUS at 23:50

## 2025-04-01 RX ADMIN — POLYETHYLENE GLYCOL 3350 1 PACKET: 17 POWDER, FOR SOLUTION ORAL at 10:30

## 2025-04-01 RX ADMIN — ENALAPRIL MALEATE 5 MG: 2.5 TABLET ORAL at 05:28

## 2025-04-01 RX ADMIN — AMPICILLIN AND SULBACTAM 3 G: 1; 2 INJECTION, POWDER, FOR SOLUTION INTRAMUSCULAR; INTRAVENOUS at 12:41

## 2025-04-01 RX ADMIN — AMPICILLIN AND SULBACTAM 3 G: 1; 2 INJECTION, POWDER, FOR SOLUTION INTRAMUSCULAR; INTRAVENOUS at 05:28

## 2025-04-01 RX ADMIN — PAROXETINE HYDROCHLORIDE 20 MG: 20 TABLET, FILM COATED ORAL at 05:28

## 2025-04-01 RX ADMIN — MONTELUKAST 10 MG: 10 TABLET, FILM COATED ORAL at 05:28

## 2025-04-01 RX ADMIN — TOPIRAMATE 50 MG: 25 TABLET, FILM COATED ORAL at 05:28

## 2025-04-01 RX ADMIN — VANCOMYCIN HYDROCHLORIDE 1250 MG: 5 INJECTION, POWDER, LYOPHILIZED, FOR SOLUTION INTRAVENOUS at 15:05

## 2025-04-01 RX ADMIN — SODIUM CHLORIDE, POTASSIUM CHLORIDE, SODIUM LACTATE AND CALCIUM CHLORIDE: 600; 310; 30; 20 INJECTION, SOLUTION INTRAVENOUS at 07:57

## 2025-04-01 RX ADMIN — DAKIN'S SOLUTION 0.125% (QUARTER STRENGTH) 25 ML: 0.12 SOLUTION at 05:28

## 2025-04-01 RX ADMIN — PRAVASTATIN SODIUM 40 MG: 20 TABLET ORAL at 05:28

## 2025-04-01 RX ADMIN — OXYCODONE HYDROCHLORIDE 5 MG: 5 TABLET ORAL at 21:50

## 2025-04-01 RX ADMIN — OXYCODONE HYDROCHLORIDE 2.5 MG: 5 TABLET ORAL at 10:46

## 2025-04-01 RX ADMIN — FLUTICASONE FUROATE, UMECLIDINIUM BROMIDE AND VILANTEROL TRIFENATATE 1 PUFF: 100; 62.5; 25 POWDER RESPIRATORY (INHALATION) at 05:28

## 2025-04-01 RX ADMIN — SENNOSIDES AND DOCUSATE SODIUM 2 TABLET: 50; 8.6 TABLET ORAL at 16:34

## 2025-04-01 RX ADMIN — DAKIN'S SOLUTION 0.125% (QUARTER STRENGTH) 1 ML: 0.12 SOLUTION at 16:35

## 2025-04-01 RX ADMIN — ENOXAPARIN SODIUM 40 MG: 100 INJECTION SUBCUTANEOUS at 16:34

## 2025-04-01 RX ADMIN — VANCOMYCIN HYDROCHLORIDE 1250 MG: 5 INJECTION, POWDER, LYOPHILIZED, FOR SOLUTION INTRAVENOUS at 02:30

## 2025-04-01 ASSESSMENT — ENCOUNTER SYMPTOMS
ABDOMINAL PAIN: 0
NAUSEA: 0
SHORTNESS OF BREATH: 0
FEVER: 0
VOMITING: 0

## 2025-04-01 ASSESSMENT — PAIN DESCRIPTION - PAIN TYPE
TYPE: ACUTE PAIN
TYPE: OTHER (COMMENT)

## 2025-04-01 NOTE — Clinical Note
REFERRAL APPROVAL NOTICE         Sent on April 1, 2025                   Jennifer Monge  [address] [place] [address]                   Dear Ms. Monge,    After a careful review of the medical information and benefit coverage, [facility] has processed your referral. See below for additional details.    If applicable, you must be actively enrolled with your insurance for coverage of the authorized service. If you have any questions regarding your coverage, please contact your insurance directly.    REFERRAL INFORMATION   Referral #:  [id number]  Referred-To Department    Referred-By Provider:  Wound Clinic    Margareth Steele M.D.   [facility]      [address]  [place] [address]  [phone] [address]  [place] [id number] [phone]    Referral Start Date:  03/31/2025  Referral End Date:   03/31/2026             SCHEDULING  If you do not already have an appointment, please call [phone] to make an appointment.     MORE INFORMATION  If you do not already have a [employer] account, sign up at: [email or web address]  You can access your medical information, make appointments, see lab results, billing information, and more.  If you have questions regarding this referral, please contact  the [facility] Referrals department at:             [phone]. Monday - Friday 8:00AM - 5:00PM.     Sincerely,    [facility]

## 2025-04-01 NOTE — PROGRESS NOTES
Alert and able to let her needs known, up self with a steady gait - dressing changes to the umbilicus twice a day as ordered along with iv abx. Cont plan of care, call light within reach

## 2025-04-01 NOTE — DISCHARGE PLANNING
Case Management Discharge Planning    Admission Date: 3/30/2025  GMLOS: 3.1  ALOS: 2    6-Clicks ADL Score: 24  6-Clicks Mobility Score: 24      Anticipated Discharge Dispo: Discharge Disposition: Discharged to home/self care (01)    DME Needed: No    Action(s) Taken: Case discussed in IDT rounds pt will need wound care upon discharge. Appointment was made for Friday at 9am pt will need to arrive 10 minutes prior to appointment.    Escalations Completed: None    Medically Clear: No    Barriers to Discharge: Medical clearance

## 2025-04-02 VITALS
OXYGEN SATURATION: 94 % | WEIGHT: 185.85 LBS | HEIGHT: 65 IN | DIASTOLIC BLOOD PRESSURE: 82 MMHG | RESPIRATION RATE: 18 BRPM | SYSTOLIC BLOOD PRESSURE: 128 MMHG | BODY MASS INDEX: 30.96 KG/M2 | TEMPERATURE: 97.2 F | HEART RATE: 72 BPM

## 2025-04-02 PROBLEM — D72.829 LEUKOCYTOSIS: Status: RESOLVED | Noted: 2025-03-30 | Resolved: 2025-04-02

## 2025-04-02 LAB
ANION GAP SERPL CALC-SCNC: 11 MMOL/L (ref 7–16)
BACTERIA WND AEROBE CULT: NORMAL
BUN SERPL-MCNC: 9 MG/DL (ref 8–22)
CALCIUM SERPL-MCNC: 8.4 MG/DL (ref 8.5–10.5)
CHLORIDE SERPL-SCNC: 109 MMOL/L (ref 96–112)
CO2 SERPL-SCNC: 20 MMOL/L (ref 20–33)
CREAT SERPL-MCNC: 0.75 MG/DL (ref 0.5–1.4)
ERYTHROCYTE [DISTWIDTH] IN BLOOD BY AUTOMATED COUNT: 44.7 FL (ref 35.9–50)
GFR SERPLBLD CREATININE-BSD FMLA CKD-EPI: 96 ML/MIN/1.73 M 2
GLUCOSE SERPL-MCNC: 100 MG/DL (ref 65–99)
GRAM STN SPEC: NORMAL
HCT VFR BLD AUTO: 39.2 % (ref 37–47)
HGB BLD-MCNC: 13.4 G/DL (ref 12–16)
MCH RBC QN AUTO: 32.5 PG (ref 27–33)
MCHC RBC AUTO-ENTMCNC: 34.2 G/DL (ref 32.2–35.5)
MCV RBC AUTO: 95.1 FL (ref 81.4–97.8)
PLATELET # BLD AUTO: 337 K/UL (ref 164–446)
PMV BLD AUTO: 9.4 FL (ref 9–12.9)
POTASSIUM SERPL-SCNC: 4.1 MMOL/L (ref 3.6–5.5)
RBC # BLD AUTO: 4.12 M/UL (ref 4.2–5.4)
SIGNIFICANT IND 70042: NORMAL
SITE SITE: NORMAL
SODIUM SERPL-SCNC: 140 MMOL/L (ref 135–145)
SOURCE SOURCE: NORMAL
VANCOMYCIN TROUGH SERPL-MCNC: 15.3 UG/ML (ref 10–20)
WBC # BLD AUTO: 8 K/UL (ref 4.8–10.8)

## 2025-04-02 PROCEDURE — 700102 HCHG RX REV CODE 250 W/ 637 OVERRIDE(OP): Performed by: INTERNAL MEDICINE

## 2025-04-02 PROCEDURE — A9270 NON-COVERED ITEM OR SERVICE: HCPCS | Performed by: HOSPITALIST

## 2025-04-02 PROCEDURE — 700102 HCHG RX REV CODE 250 W/ 637 OVERRIDE(OP): Performed by: HOSPITALIST

## 2025-04-02 PROCEDURE — 85027 COMPLETE CBC AUTOMATED: CPT

## 2025-04-02 PROCEDURE — 700105 HCHG RX REV CODE 258: Performed by: INTERNAL MEDICINE

## 2025-04-02 PROCEDURE — 99239 HOSP IP/OBS DSCHRG MGMT >30: CPT | Performed by: HOSPITALIST

## 2025-04-02 PROCEDURE — 80202 ASSAY OF VANCOMYCIN: CPT

## 2025-04-02 PROCEDURE — 80048 BASIC METABOLIC PNL TOTAL CA: CPT

## 2025-04-02 PROCEDURE — 700111 HCHG RX REV CODE 636 W/ 250 OVERRIDE (IP): Performed by: INTERNAL MEDICINE

## 2025-04-02 PROCEDURE — A9270 NON-COVERED ITEM OR SERVICE: HCPCS | Performed by: INTERNAL MEDICINE

## 2025-04-02 RX ORDER — AMOXICILLIN 250 MG
2 CAPSULE ORAL EVERY EVENING
Qty: 30 TABLET | Refills: 0 | Status: SHIPPED | OUTPATIENT
Start: 2025-04-02

## 2025-04-02 RX ORDER — DOXYCYCLINE 100 MG/1
100 TABLET ORAL EVERY 12 HOURS
Status: DISCONTINUED | OUTPATIENT
Start: 2025-04-02 | End: 2025-04-02 | Stop reason: HOSPADM

## 2025-04-02 RX ORDER — OXYCODONE HYDROCHLORIDE 5 MG/1
5 TABLET ORAL EVERY 6 HOURS PRN
Qty: 20 TABLET | Refills: 0 | Status: SHIPPED | OUTPATIENT
Start: 2025-04-02 | End: 2025-04-07

## 2025-04-02 RX ORDER — DOXYCYCLINE 100 MG/1
100 CAPSULE ORAL 2 TIMES DAILY
Qty: 8 CAPSULE | Refills: 0 | Status: ACTIVE | OUTPATIENT
Start: 2025-04-02 | End: 2025-04-06

## 2025-04-02 RX ADMIN — DAKIN'S SOLUTION 0.125% (QUARTER STRENGTH) 20 ML: 0.12 SOLUTION at 05:42

## 2025-04-02 RX ADMIN — DOXYCYCLINE 100 MG: 100 TABLET, FILM COATED ORAL at 11:51

## 2025-04-02 RX ADMIN — FLUTICASONE FUROATE, UMECLIDINIUM BROMIDE AND VILANTEROL TRIFENATATE 1 PUFF: 100; 62.5; 25 POWDER RESPIRATORY (INHALATION) at 05:42

## 2025-04-02 RX ADMIN — SODIUM CHLORIDE, POTASSIUM CHLORIDE, SODIUM LACTATE AND CALCIUM CHLORIDE: 600; 310; 30; 20 INJECTION, SOLUTION INTRAVENOUS at 00:28

## 2025-04-02 RX ADMIN — AMOXICILLIN AND CLAVULANATE POTASSIUM 1 TABLET: 875; 125 TABLET, FILM COATED ORAL at 11:51

## 2025-04-02 RX ADMIN — PRAVASTATIN SODIUM 40 MG: 20 TABLET ORAL at 04:42

## 2025-04-02 RX ADMIN — PAROXETINE HYDROCHLORIDE 20 MG: 20 TABLET, FILM COATED ORAL at 04:42

## 2025-04-02 RX ADMIN — VANCOMYCIN HYDROCHLORIDE 1250 MG: 5 INJECTION, POWDER, LYOPHILIZED, FOR SOLUTION INTRAVENOUS at 02:58

## 2025-04-02 RX ADMIN — TOPIRAMATE 50 MG: 25 TABLET, FILM COATED ORAL at 04:42

## 2025-04-02 RX ADMIN — AMPICILLIN AND SULBACTAM 3 G: 1; 2 INJECTION, POWDER, FOR SOLUTION INTRAMUSCULAR; INTRAVENOUS at 05:45

## 2025-04-02 RX ADMIN — ENALAPRIL MALEATE 5 MG: 2.5 TABLET ORAL at 04:42

## 2025-04-02 RX ADMIN — MONTELUKAST 10 MG: 10 TABLET, FILM COATED ORAL at 04:42

## 2025-04-02 ASSESSMENT — PAIN DESCRIPTION - PAIN TYPE: TYPE: ACUTE PAIN

## 2025-04-02 NOTE — PROGRESS NOTES
Jennifer Monge has been provided discharge instructions, to include follow up care, home medications, and activity/diet reviewed. Copy of discharge instructions in patient chart, signed and reviewed. Patient verbalizes the understanding of the discharge instructions. Arm band removed. Patient did not have home meds during admit. Questions and concerns addressed prior to leaving the discharge lounge. Transported via car, accompanied by . Patient discharge to home.

## 2025-04-02 NOTE — CARE PLAN
Pt AO x 4.  Pt denies pain during initial assessment.  Call light and belongings within reach.  Bed locked and in lowest position.  Hourly rounding.  Needs currently met.             The patient is Stable - Low risk of patient condition declining or worsening    Shift Goals  Clinical Goals: MAINTAIN skin integrity during the shift  Patient Goals: Maintain comfort throughout shift  Family Goals: REBECCA    Progress made toward(s) clinical / shift goals:      Problem: Knowledge Deficit - Standard  Goal: Patient and family/care givers will demonstrate understanding of plan of care, disease process/condition, diagnostic tests and medications  Outcome: Progressing     Problem: Pain - Standard  Goal: Alleviation of pain or a reduction in pain to the patient’s comfort goal  Outcome: Progressing     Problem: Safety  Goal: Will remain free from injury  Outcome: Progressing     Problem: Bowel Elimination  Goal: Establish and maintain regular bowel function  Outcome: Progressing     Problem: Infection - Standard  Goal: Patient will remain free from infection  Outcome: Progressing     Problem: Wound/ / Incision Healing  Goal: Patient's wound/surgical incision will decrease in size and heals properly  Outcome: Progressing         Patient is not progressing towards the following goals:

## 2025-04-02 NOTE — CARE PLAN
Pt AO x 4.  Pt denies pain during initial assessment.  Call light and belongings within reach.  Bed locked and in lowest position.  Hourly rounding.  Needs currently met.       Patient sent to the [facility] with transport and all personal belongings, IV removed, and wound supplies. Patient's  showed how to do wound care and advised wound care appointment is scheduled for 4/4 at 0900.      The patient is Stable - Low risk of patient condition declining or worsening    Shift Goals  Clinical Goals: Pts pain will remain < 5 throughout the shift  Patient Goals: Comfort and rest  Family Goals: REBECCA    Progress made toward(s) clinical / shift goals:      Problem: Knowledge Deficit - Standard  Goal: Patient and family/care givers will demonstrate understanding of plan of care, disease process/condition, diagnostic tests and medications  Outcome: Met     Problem: Pain - Standard  Goal: Alleviation of pain or a reduction in pain to the patient’s comfort goal  Outcome: Met     Problem: Safety  Goal: Will remain free from injury  Outcome: Met  Goal: Will remain free from falls  Outcome: Met     Problem: Bowel Elimination  Goal: Establish and maintain regular bowel function  Outcome: Met     Problem: Infection - Standard  Goal: Patient will remain free from infection  Outcome: Met     Problem: Wound/ / Incision Healing  Goal: Patient's wound/surgical incision will decrease in size and heals properly  Outcome: Met       Patient is not progressing towards the following goals:

## 2025-04-02 NOTE — DISCHARGE PLANNING
Case Management Discharge Planning    Admission Date: 3/30/2025  GMLOS: 3.1  ALOS: 3    6-Clicks ADL Score: 24  6-Clicks Mobility Score: 24      Anticipated Discharge Dispo: Discharge Disposition: Discharged to home/self care (01)    [facility] rec'd FMLA submission request from Pt and submitted to Hospitalist Coordinator for processing. Original FMLA packet was given back to the Pt. Hospitalist Coordinator will send completed form to Pt's email when completed.

## 2025-04-02 NOTE — DISCHARGE SUMMARY
Discharge Summary    CHIEF COMPLAINT ON ADMISSION  Chief Complaint   Patient presents with    Other     Px belly button is protruding, red, and edematous. Rash to upper left side of her abdomen as well. Hot to touch.        Reason for Admission  Abd Pain     Admission Date  3/30/2025    CODE STATUS  Full Code    HPI & HOSPITAL COURSE  This is a 49 y.o. female here with umbilical abscess.  Jennifer Monge is a 49-year-old female with PMHx HTN.  Admitted 3/30 for umbilical redness and swelling.    Per history-patient noted pain and swelling to her umbilicus on 3/26.  This has been associated with chills.  No recent surgery or trauma to the area.    In the ED: Vitals are stable.  CT A/P: Umbilical abscess measuring 3.3 x 2.4 x 2.5 cm in diameter.  No hernia or communication with peritoneal cavity.  I&D was performed by EDP.  Wound cultures pending.  Started on IV Unasyn and vancomycin.  Jennifer Monge is a 49-year-old female with PMHx HTN.  Admitted 3/30 for umbilical redness and swelling.     Per history-patient noted pain and swelling to her umbilicus on 3/26.  This has been associated with chills.  No recent surgery or trauma to the area.     In the ED: Vitals are stable.  CT A/P: Umbilical abscess measuring 3.3 x 2.4 x 2.5 cm in diameter.  No hernia or communication with peritoneal cavity.  I&D was performed by EDP.  Wound cultures pending.  Started on IV Unasyn and vancomycin.     Interval Problem Update  3/31: Patient remains afebrile.   through 140.  WBC 10.8 from 11.4.  Chemistry panel normal.  Wound cultures NGTD. Continue IV vanco and unasyn today. Follow culture results and de-escalate as able.  4/1: Seen today.  Daughter is at bedside.  Incredible improvement of abdominal cellulitis noted.  Continue with packing daily.  Patient will need outpatient wound care clinic recheck once discharged to home.  3/30 wound culture pending.  Likely home tomorrow with wound culture results.    Blood culture and  wound culture from 3/30 no growth x 72 hours.  Patient completed 3 days IV vancomycin and unasyn 3/30-4/2, one dose of zosyn IV on 3/30.    Her wound has improved, no further drainage, no cellulitis abdominal wall, continued packing of incision of abscess at discharge.  Patient to complete total 7 days augmentin and doxycycline.   Pain medication for packing.   to learn unpacking and repacking until no longer able to pack.    Patient on RA, ambulating, eating, no fever and ready for home with family.  Therefore, she is discharged in good and stable condition to home with close outpatient follow-up.    The patient met 2-midnight criteria for an inpatient stay at the time of discharge.    Discharge Date  4/2/2025    FOLLOW UP ITEMS POST DISCHARGE  Follow up PCP for wound check in 1 week.  Follow up wound care clinic in 3-4 days.    DISCHARGE DIAGNOSES  Principal Problem:    Abscess, umbilical (POA: Yes)  Active Problems:    HTN (hypertension) (POA: Yes)    Migraine with aura and without status migrainosus, not intractable (POA: Yes)    Depression (POA: Yes)  Resolved Problems:    Leukocytosis (POA: Yes)      FOLLOW UP  Future Appointments   Date Time Provider Department Center   4/4/2025  9:00 AM YARA Leon [address] [place] [address]   4/11/2025  9:00 AM ELIE Ashton [address] [place] [address]   4/18/2025 10:00 AM ELIE Barry [address] [place] [address]   4/22/2025  7:20 AM VIVIANA Dyson [facility] None   4/25/2025 10:00 AM ELIE Ashton [address] [place] [address]   5/2/2025  9:30 AM YARA Leon [address] [place] [address]     Wound Care Center  [address] [facility] [address]  [facility] [id number] [phone]  Follow up  For wound re-check    Brennon Desai M.D.  [address] [facility] [address]  [facility] [id number] [phone]    Schedule an appointment as soon as possible for a visit in 1 week(s)  For wound re-check      MEDICATIONS ON DISCHARGE     Medication List        START taking these medications        Instructions    amoxicillin-clavulanate 875-125 MG Tabs  Commonly known as: Augmentin   Take 1 Tablet by mouth 2 times a day for 4 days.  Dose: 1 Tablet     doxycycline 100 MG capsule  Commonly known as: Monodox   Take 1 Capsule by mouth 2 times a day for 4 days.  Dose: 100 mg     oxyCODONE immediate-release 5 MG Tabs  Commonly known as: Roxicodone   Take 1 Tablet by mouth every 6 hours as needed for Severe Pain for up to 5 days.  Dose: 5 mg     senna-docusate 8.6-50 MG Tabs  Commonly known as: Pericolace Or Senokot S   Take 2 Tablets by mouth every evening.  Dose: 2 Tablet            CHANGE how you take these medications        Instructions   montelukast 10 MG Tabs  What changed: when to take this  Commonly known as: Singulair   Take 1 Tablet by mouth at bedtime.  Dose: 10 mg            CONTINUE taking these medications        Instructions   albuterol 108 (90 Base) MCG/ACT Aers inhalation aerosol   Inhale 2 Puffs every four hours as needed for Shortness of Breath.  Dose: 2 Puff     enalapril 5 MG Tabs  Commonly known as: Vasotec   Take 5 mg by mouth every day.  Dose: 5 mg     ibuprofen 200 MG Tabs  Commonly known as: Motrin   Take 800 mg by mouth every 6 hours as needed for Mild Pain.  Dose: 800 mg     PARoxetine 20 MG Tabs  Commonly known as: Paxil   Take 20 mg by mouth every day.  Dose: 20 mg     pravastatin 40 MG tablet  Commonly known as: Pravachol   Take 40 mg by mouth every day.  Dose: 40 mg     topiramate 50 MG tablet  Commonly known as: Topamax   Take 1 Tablet by mouth every day.  Dose: 50 mg     Trelegy Ellipta 100-62.5-25 MCG/INH inhaler  Generic drug: fluticasone-umeclidinium-vilanterol   Doctor's comments: Year prescription  Inhale 1 Inhalation every day.  Dose: 1 Inhalation      zolmitriptan 5 MG nasal solution  Commonly known as: Zomig   Administer 1 Spray into affected nostril(S) one time as needed for Headache (migraine) for up to 1 dose. Can re-dose in 1-2 hours if no resolution of migraine  Dose: 1 Spray             STOP taking these medications      ondansetron 4 MG Tbdp  Commonly known as: Zofran ODT              Allergies  No Known Allergies    DIET  Orders Placed This Encounter   Procedures    Diet Order Diet: Regular     Standing Status:   Standing     Number of Occurrences:   1     Diet::   Regular [1]       ACTIVITY  As tolerated.  Weight bearing as tolerated    CONSULTATIONS  Phone consult to general surgery    PROCEDURES  ED ULTRASOUND GUIDED ABSCESS INCISION AND DRAINAGE     Indication: Abscess     Procedure: Bedside ultrasound was utilized to identify and localize fluid collection and image retained as below.  The patient was positioned appropriately and the skin over the incision site was prepped with betadine and draped in a sterile fashion. Local anesthesia was obtained by infiltration using 1% Lidocaine with epinephrine.  An incision using an 11 blade was then made over the left side of the umbilicus and approximately 10 cc of purulent material was expressed. Loculations were broken up using forceps and more of the material was able to be expressed. The drainage cavity was then irrigated and packed with sterile gauze.      The patient tolerated the procedure well.     Complications: None     LABORATORY  Lab Results   Component Value Date    SODIUM 140 04/02/2025    POTASSIUM 4.1 04/02/2025    CHLORIDE 109 04/02/2025    CO2 20 04/02/2025    GLUCOSE 100 (H) 04/02/2025    BUN 9 04/02/2025    CREATININE 0.75 04/02/2025        Lab Results   Component Value Date    WBC 8.0 04/02/2025    HEMOGLOBIN 13.4 04/02/2025    HEMATOCRIT 39.2 04/02/2025    PLATELETCT 337 04/02/2025        Total time of the discharge process exceeds 42 minutes.

## 2025-04-02 NOTE — CARE PLAN
The patient is Stable - Low risk of patient condition declining or worsening    Shift Goals  Clinical Goals: Pts pain will remain < 5 throughout the shift  Patient Goals: Comfort and rest  Family Goals: REBECCA    Progress made toward(s) clinical / shift goals:  Pts pain controlled with PRN pain medication and distraction. Pt required pain medication pre-wound care and expressed that it help with the discomfort from the procedure.      Problem: Knowledge Deficit - Standard  Goal: Patient and family/care givers will demonstrate understanding of plan of care, disease process/condition, diagnostic tests and medications  Outcome: Progressing     Problem: Pain - Standard  Goal: Alleviation of pain or a reduction in pain to the patient’s comfort goal  Outcome: Progressing     Problem: Safety  Goal: Will remain free from injury  Outcome: Progressing  Goal: Will remain free from falls  Outcome: Progressing     Problem: Safety  Goal: Will remain free from falls  Outcome: Progressing     Problem: Infection - Standard  Goal: Patient will remain free from infection  Outcome: Progressing       Patient is not progressing towards the following goals:

## 2025-04-02 NOTE — PROGRESS NOTES
[facility] Medicine Daily Progress Note    Date of Service  4/1/2025    Chief Complaint  Jennifer Monge is a 49 y.o. female admitted 3/30/2025 with umbilical pain/abscess.    Hospital Course  Jennifer Monge is a 49-year-old female with PMHx HTN.  Admitted 3/30 for umbilical redness and swelling.    Per history-patient noted pain and swelling to her umbilicus on 3/26.  This has been associated with chills.  No recent surgery or trauma to the area.    In the ED: Vitals are stable.  CT A/P: Umbilical abscess measuring 3.3 x 2.4 x 2.5 cm in diameter.  No hernia or communication with peritoneal cavity.  I&D was performed by EDP.  Wound cultures pending.  Started on IV Unasyn and vancomycin.    Interval Problem Update  3/31: Patient remains afebrile.   through 140.  WBC 10.8 from 11.4.  Chemistry panel normal.  Wound cultures NGTD. Continue IV vanco and unasyn today. Follow culture results and de-escalate as able.  4/1: Seen today.  Daughter is at bedside.  Incredible improvement of abdominal cellulitis noted.  Continue with packing daily.  Patient will need outpatient wound care clinic recheck once discharged to home.  3/30 wound culture pending.  Likely home tomorrow with wound culture results.    I have discussed this patient's plan of care and discharge plan at IDT rounds today with Case Management, Nursing, Nursing leadership, and other members of the IDT team.    Consultants/Specialty  Wound care     Code Status  Full Code    Disposition  The patient is not medically cleared for discharge to home or a post-acute facility.  Anticipate discharge to: home with close outpatient follow-up    I have placed the appropriate orders for post-discharge needs.    Review of Systems  Review of Systems   Constitutional:  Negative for fever and malaise/fatigue.   Respiratory:  Negative for shortness of breath.    Cardiovascular:  Negative for chest pain and leg swelling.   Gastrointestinal:  Negative for abdominal pain,  nausea and vomiting.        Physical Exam  Temp:  [35.9 °C (96.7 °F)-36.6 °C (97.9 °F)] 36.6 °C (97.9 °F)  Pulse:  [52-89] 68  Resp:  [16-18] 18  BP: (127-144)/(77-83) 139/78  SpO2:  [94 %-97 %] 95 %    Physical Exam  Vitals and nursing note reviewed.   Constitutional:       General: She is not in acute distress.     Appearance: Normal appearance. She is not ill-appearing.   Cardiovascular:      Rate and Rhythm: Normal rate and regular rhythm.   Pulmonary:      Effort: Pulmonary effort is normal.      Breath sounds: Normal breath sounds.   Abdominal:      General: Bowel sounds are normal. There is no distension.      Palpations: Abdomen is soft.      Tenderness: There is abdominal tenderness.      Comments: Small wound to umbilicus with significant surrounding erythema; packing present    Skin:     General: Skin is warm and dry.   Neurological:      Mental Status: She is alert and oriented to person, place, and time.   Psychiatric:         Mood and Affect: Mood normal.         Behavior: Behavior normal.         Fluids    Intake/Output Summary (Last 24 hours) at 4/1/2025 1752  Last data filed at 4/1/2025 1700  Gross per 24 hour   Intake 600 ml   Output --   Net 600 ml        Laboratory  Recent Labs     03/30/25  1316 03/31/25  0136 04/01/25  0310   WBC 11.4* 10.8 6.5   RBC 4.72 4.14* 4.08*   HEMOGLOBIN 15.1 13.5 13.1   HEMATOCRIT 45.1 39.8 40.8   MCV 95.6 96.1 100.0*   MCH 32.0 32.6 32.1   MCHC 33.5 33.9 32.1*   RDW 45.1 46.5 47.8   PLATELETCT 333 309 259   MPV 9.3 9.4 9.7     Recent Labs     03/30/25  1316 03/31/25  0136   SODIUM 139 138   POTASSIUM 4.3 3.8   CHLORIDE 107 107   CO2 20 21   GLUCOSE 92 127*   BUN 10 8   CREATININE 0.76 0.80   CALCIUM 9.5 8.3*                   Imaging  CT-ABDOMEN-PELVIS WITH   Final Result      1. Umbilical abscess measuring 3.3 x 2.4 x 2.5 cm in diameter.   2. No hernia or communication with the peritoneal cavity.   3. Cholelithiasis.   4. Uterine fibroid.   5. Atherosclerosis.   6.  Multiple hypodense lesions in the liver and spleen, most compatible with cysts or hemangiomata.           Assessment/Plan  * Abscess, umbilical- (present on admission)  Assessment & Plan  No trauma or wounds previously  S/p I&D by EDP  - Follow wound cultures  - Continue IV vanco and unaysn today  - Wound care consult placed  - Pain management as requested     Leukocytosis  Assessment & Plan  WBC 11.4 --> 10.8  Most likely secondary to umbilical abscess   - Repeat CBC in AM    Depression- (present on admission)  Assessment & Plan  Continue home paroxetine     Migraine with aura and without status migrainosus, not intractable- (present on admission)  Assessment & Plan  No symptoms of acute headache.  Continue to monitor.    HTN (hypertension)- (present on admission)  Assessment & Plan  SBP ranging 137-140  Continue home enalapril  Order IV hydralazine as needed with parameters.         VTE prophylaxis:    enoxaparin ppx      I have performed a physical exam and reviewed and updated ROS and Plan today (4/1/2025). In review of yesterday's note (3/31/2025), there are no changes except as documented above.

## 2025-04-02 NOTE — DISCHARGE INSTRUCTIONS
FOLLOW UP ITEMS POST DISCHARGE  Follow up PCP for wound check in 1 week.  Follow up wound care clinic in 3-4 days.

## 2025-04-04 ENCOUNTER — OFFICE VISIT (OUTPATIENT)
Dept: WOUND CARE | Facility: MEDICAL CENTER | Age: 50
End: 2025-04-04
Attending: NURSE PRACTITIONER
Payer: COMMERCIAL

## 2025-04-04 DIAGNOSIS — S31.109D OPEN WOUND OF ABDOMEN, SUBSEQUENT ENCOUNTER: ICD-10-CM

## 2025-04-04 DIAGNOSIS — L08.9 WOUND INFECTION: ICD-10-CM

## 2025-04-04 DIAGNOSIS — L02.216 ABSCESS, UMBILICAL: ICD-10-CM

## 2025-04-04 DIAGNOSIS — T14.8XXA WOUND INFECTION: ICD-10-CM

## 2025-04-04 LAB
BACTERIA BLD CULT: NORMAL
BACTERIA BLD CULT: NORMAL
SIGNIFICANT IND 70042: NORMAL
SIGNIFICANT IND 70042: NORMAL
SITE SITE: NORMAL
SITE SITE: NORMAL
SOURCE SOURCE: NORMAL
SOURCE SOURCE: NORMAL

## 2025-04-04 PROCEDURE — 11042 DBRDMT SUBQ TIS 1ST 20SQCM/<: CPT

## 2025-04-04 PROCEDURE — 11042 DBRDMT SUBQ TIS 1ST 20SQCM/<: CPT | Performed by: NURSE PRACTITIONER

## 2025-04-04 PROCEDURE — 99214 OFFICE O/P EST MOD 30 MIN: CPT | Mod: 25 | Performed by: NURSE PRACTITIONER

## 2025-04-04 PROCEDURE — 99214 OFFICE O/P EST MOD 30 MIN: CPT

## 2025-04-04 ASSESSMENT — ENCOUNTER SYMPTOMS
SHORTNESS OF BREATH: 0
CONSTIPATION: 0
COUGH: 0
NAUSEA: 0
DIARRHEA: 0
FEVER: 0
CHILLS: 0
VOMITING: 0

## 2025-04-04 NOTE — PATIENT INSTRUCTIONS
-Keep dressings clean and dry. Change dressings every 1-2 days, and if they become over saturated, soiled or fall off.     -On the days you are not changing your dressings, avoid getting the dressings wet. It is not harmful to get your wound wet when you are washing your wound before applying a new dressing.    -If you need to change your dressings at home: Wash your wound with normal saline, wound cleanser, or unscented soap and water prior to applying your new dressings. Please avoid cleansing with hydrogen peroxide or rubbing alcohol. Hydrogen peroxide and rubbing alcohol are toxic to new tissue and skin cells.    -Should you experience any significant changes in your wound(s), such as signs of infection (increasing redness, swelling, localized heat, increased pain, fever > 101 F, chills) or have any questions regarding your home care instructions, please contact the wound center at [phone]. If after hours, contact your primary care physician or go to the hospital emergency room.     -If you are admitted to any hospital, you will need a new referral to come back to the wound clinic and any scheduled appointments that you already have, may be cancelled.     -If you are 5 or more minutes late for an appointment, we reserve the right to cancel and reschedule that appointment. Additionally, if you are habitually late or not showing (3 late cancellations and/or no shows), we reserve the right to cancel your remaining appointments and it will be your responsibility to obtain a new referral if services are still needed.

## 2025-04-04 NOTE — PROGRESS NOTES
Provider Encounter- Full Thickness wound    HISTORY OF PRESENT ILLNESS  Wound History:    START OF CARE IN CLINIC: 4/4/2025    REFERRING PROVIDER: Rowena Paige      WOUND- Full Thickness Wound   LOCATION: Umbilicus   HISTORY: 49-year-old female referred to the clinic for periumbilical wound.  She presented to the emergency room on 3/30 with a 4-day history of abdominal pain, swelling and redness.  She initially thought she had an umbilical hernia.  CT of abdomen and pelvis showed an abscess measuring 3.3 x 2.4 x 2.5 cm in diameter.  There is no evidence of hernia or communication into the peritoneal cavity.  An I&D was performed in the ED, approximately 10 cc of purulent drainage expressed.   Patient was admitted for IV antibiotics, Unasyn and vancomycin.  Patient's symptoms and appearance of wound improved significantly.  She was discharged home on 4/2 on p.o. Augmentin and doxycycline.  She was also referred to the outpatient wound clinic for further management.  While waiting to get into the clinic, her patient was packing the wound daily with saline gauze.    Pertinent Medical History: Abdominoplasty, hypertension    TOBACCO USE: She has never smoked to smokeless tobacco    Patient's problem list, allergies, and current medications reviewed and updated in Epic    Interval History:  4/4/2025 Initial clinic visit with ANISHA Springer, MARIA DEL CARMEN-BC, CWPATRICKN, C[facility].   Patient states she is feeling well, denies fevers, chills, nausea, vomiting, cough or shortness of breath.  She is still on Augmentin and doxycycline, tolerating well, should be completing these in a couple of days.  She denies having much pain from this wound.      REVIEW OF SYSTEMS:   Review of Systems   Constitutional:  Negative for chills and fever.   Respiratory:  Negative for cough and shortness of breath.    Cardiovascular:  Negative for chest pain.   Gastrointestinal:  Negative for constipation, diarrhea, nausea and vomiting.       PHYSICAL  EXAMINATION:   There were no vitals taken for this visit.    Physical Exam  Constitutional:       Appearance: Normal appearance.   Pulmonary:      Effort: Pulmonary effort is normal.   Abdominal:      Palpations: Abdomen is soft.   Skin:     Comments: Periumbilical wound, full-thickness: Tract radiating inward, approximately 2 cm deep.  Thin layer of slough to wound bed and walls of tract.  Moderate serosanguineous drainage, no odor.  No periwound erythema.  Induration to periwound from approximately 10-1 o'clock, radiating 0.5 cm   Neurological:      Mental Status: She is alert and oriented to person, place, and time.       WOUND ASSESSMENT  Wound 03/30/25 Full Thickness Wound Umbilicus --Umbilicus (Active)   Wound Image    04/04/25 0915   Site Assessment Pink;Yellow 04/04/25 0915   Periwound Assessment Scar tissue 04/04/25 0915   Margins Unattached edges 04/04/25 0915   Drainage Amount Moderate 04/04/25 0915   Drainage Description Serosanguineous 04/04/25 0915   Treatments Cleansed;Topical Lidocaine;Provider debridement 04/04/25 0915   Wound Cleansing Normal Saline Irrigation 04/04/25 0915   Dressing Changed New 04/04/25 0915   Dressing Cleansing/Solutions Not Applicable 04/04/25 0915   Dressing Options Silver Strip Packing;Island Dressing 04/04/25 0915   Non-staged Wound Description Full thickness 04/04/25 0915   Wound Length (cm) 0.5 cm 04/04/25 0915   Wound Width (cm) 0.9 cm 04/04/25 0915   Wound Depth (cm) 1.9 cm 04/04/25 0915   Wound Surface Area (cm^2) 0.45 cm^2 04/04/25 0915   Wound Volume (cm^3) 0.855 cm^3 04/04/25 0915   Post-Procedure Length (cm) 0.5 cm 04/04/25 0915   Post-Procedure Width (cm) 0.9 cm 04/04/25 0915   Post-Procedure Depth (cm) 2 cm 04/04/25 0915   Post-Procedure Surface Area (cm^2) 0.45 cm^2 04/04/25 0915   Post-Procedure Volume (cm^3) 0.9 cm^3 04/04/25 0915   Wound Healing % 51 04/04/25 0915   Tunneling (cm) 0 cm 04/04/25 0915   Undermining (cm) 0 cm 04/04/25 0915   Wound Odor None  "04/04/25 0915   Exposed Structures None 04/04/25 0915   Number of days: 5       PROCEDURE:   -2% viscous lidocaine applied topically to wound bed for approximately 5 minutes prior to debridement  -Curette used to debride wound bed.  Excisional debridement was performed to remove devitalized tissue until healthy, bleeding tissue was visualized.  Total area debrided was approximately 2 cm², including into wound tract.. Tissue debrided into the subcutaneous layer.    -Bleeding controlled with manual pressure.    -Wound care completed by wound RN, refer to flowsheet  -Patient tolerated the procedure well, without c/o pain or discomfort.       Pertinent Labs and Diagnostics:    Labs:     A1c: No results found for: \"HBA1C\"       IMAGING:   3/30/2025 the CT of abdomen and pelvis      VASCULAR STUDIES: N/A    LAST  WOUND CULTURE:  DATE :   Lab Results   Component Value Date/Time    CULTRSULT No growth at 72 hours. 03/30/2025 03:41 PM         ASSESSMENT AND PLAN:     1. Abscess, umbilical  2. Open wound of abdomen, subsequent encounter    4/4/2025: S/p I&D of umbilical abscess in the ED on 3/30.  Hospitalized several days for antibiotics   - Initial assessment.  Wound appears to be healing well.  Minimal slough to wound bed.  No periwound erythema, mild periwound induration.  -Excisional debridement of wound in clinic today, medically necessary to promote wound healing.  -Patient to return to clinic weekly for assessment and debridement  -Patient to change dressing 1-2 times per week in between clinic visits     Wound care: Silver strip packing, island dressing    3. Wound infection    4/4/2025: Treated with IV antibiotics during hospitalization, discharged on p.o. doxycycline and Augmentin  - Patient should be completing her antibiotics in 2 days.  Tolerating well  - Monitor for signs and symptoms infection each clinic visit  - Pt advised to go to ER for any increased redness, swelling, drainage or odor, or if she develops " fever, chills, nausea or vomiting.           PATIENT EDUCATION  - Importance of adequate nutrition for wound healing  -Advised to go to ER for any increased redness, swelling, drainage, or odor, or if patient develops fever, chills, nausea or vomiting.     My total time spent caring for the patient on the day of the encounter was 30 minutes.   This does not include time spent on separately billable procedures/tests.       Please note that this note may have been created using voice recognition software. I have worked with technical experts from [facility] to optimize the interface.  I have made every reasonable attempt to correct obvious errors, but there may be errors of grammar and possibly content that I did not discover before finalizing the note.    N

## 2025-04-11 ENCOUNTER — NON-PROVIDER VISIT (OUTPATIENT)
Dept: WOUND CARE | Facility: MEDICAL CENTER | Age: 50
End: 2025-04-11
Attending: NURSE PRACTITIONER
Payer: COMMERCIAL

## 2025-04-11 PROCEDURE — 97602 WOUND(S) CARE NON-SELECTIVE: CPT

## 2025-04-11 NOTE — PROGRESS NOTES
Non selective wound debridement using normal saline and dry gauze. Wound has progressed so changed dressing to add moistened collagen to wound base. When collagen was placed, there was very little room to add packing so hydrofiber silver was placed over wound and then covered with island dressing. Educated patient on how to perform dressing changes. All further questions and concerns were addressed.

## 2025-04-11 NOTE — PATIENT INSTRUCTIONS
- Keep dressings clean and dry. Change dressings every 2-3 days, and if they become over saturated, soiled or fall off.     - If you need to change your dressings at home: Wash your wound with normal saline, wound cleanser, or unscented soap and water prior to applying your new dressings. Please avoid cleansing with hydrogen peroxide or rubbing alcohol. Hydrogen peroxide and rubbing alcohol are toxic to new tissue and skin cells.    - Should you experience any significant changes in your wound(s), such as infection (redness, swelling, localized heat, increased pain, fever > 101 F, chills) or have any questions regarding your home care instructions, please contact the wound center at [phone]. If after hours, contact your primary care physician or go to the hospital emergency room.     - If you are admitted to any hospital, you will need a new referral to come back to the wound clinic and any scheduled appointments that you already have, may be cancelled.    - If you are 5 or more minutes late for an appointment, we reserve the right to cancel and reschedule that appointment. Additionally, if you are habitually late or not showing (3 late cancellations and/or no shows), we reserve the right to cancel your remaining appointments and it will be your responsibility to obtain a new referral if services are still needed.

## 2025-04-18 ENCOUNTER — TELEPHONE (OUTPATIENT)
Dept: NEUROLOGY | Facility: MEDICAL CENTER | Age: 50
End: 2025-04-18
Payer: COMMERCIAL

## 2025-04-18 ENCOUNTER — NON-PROVIDER VISIT (OUTPATIENT)
Dept: WOUND CARE | Facility: MEDICAL CENTER | Age: 50
End: 2025-04-18
Attending: NURSE PRACTITIONER
Payer: COMMERCIAL

## 2025-04-18 PROCEDURE — 97602 WOUND(S) CARE NON-SELECTIVE: CPT

## 2025-04-18 NOTE — PATIENT INSTRUCTIONS
- Should you experience any significant changes in your wound(s), such as infection (redness, swelling, localized heat, increased pain, fever > 101 F, chills) or have any questions regarding your home care instructions, please contact the wound center at [phone]. If after hours, contact your primary care physician or go to the hospital emergency room.     - If you are admitted to any hospital, you will need a new referral to come back to the wound clinic and any scheduled appointments that you already have, may be cancelled.    - If you are 5 or more minutes late for an appointment, we reserve the right to cancel and reschedule that appointment. Additionally, if you are habitually late or not showing (3 late cancellations and/or no shows), we reserve the right to cancel your remaining appointments and it will be your responsibility to obtain a new referral if services are still needed.    - Resolved wound be fragile for a few days, bathe and dry area gently, only ever regains a maximum of 80% of the tensile strength of the surrounding skin, remodeling of scar can continue for 6mo - a year. Contact PCP for a referral back her if any problems with area opening and draining again.

## 2025-04-18 NOTE — PROGRESS NOTES
"[facility] NEUROLOGY  GENERAL NEUROLOGY  FOLLOW UP VISIT      HISTORY OF ILLNESS:  Jennifer Monge is a 48 y.o. woman with a history most notable for migraine. She is here from her previous appointment when she was started on topamax and zolmitriptan. She is due for a full neurological exam.Today, Jennifer provided the following interm history:    4/22/25- January 2/30, February 2/28, March 2/30, April 1/22. Zolmitriptan kills the migraine without re-dosing.     7/23/24- May 0/30 , June 2/30, July 0/23, zolmitriptan took migraine away within 30 minutes.  Intensity of migraines are now 6/10.    Below is information gathered from previous appt  Migraine description:  Starts with a shadowy aura in right then loses sight in right eye, itchy chin, an tingling in bilateral hands. She reports 10 minutes before migraine starts. Migraine starts in the bilateral frontal region. Migraine will radiate to whole head down neck and sometimes into shoulders. Quality of migraine is described as a sharp pain \"knife like\". Intensity of migraine without medications 8/10. With sumatriptan 0-4/10, Excedrin migraine 2 tablets 6/10. Migraines last for 4 hours to 12 hours. Frequency of migraines is inconsistent sometimes months apart sometimes a cluster every other day. Associated symptoms: light sensitivity, sound sensitivity, occasional nausea and vomiting, visual disturbance, tingling in bilateral hands. Triggers: stress, sleep deprivation, dehydration, skipping meals. Denies being able to trigger migraine with sudden position change, bending, position change, sneezing, coughing, laughing, or crying. Denies double vision. Migraines occur more frequently in the morning.     The following is a summary of headache symptoms, presented in my standard format:    Family History: none  Age at onset (years): 13 years old  Location: see above   Radiation: see above   Frequency: see above   Duration: see above   Headache Days/Month: January 0 /30, " February 0/29, March 0/30, April 5/17  Quality: see above   Intensity: see above   Aura: see above   Photophobia/Phonophobia/Nausea/Vomiting: yes, yes, yes, yes  Provoked by Physical Activity?: no  Triggers: see above   Associated Symptoms: see above   Autonomic Signs (such as ptosis, miosis, conjunctival injection, rhinorrhea, increased lacrimation): no  Head Trauma: hit head with seizure as teenager  Association with Menses: no  ED Visits: in teens  Hospitalizations: no  Missed Work Days ([employer]): infrequent   Sleep (hours/night): 6-8 hour  Caffeine Intake: 1 cup coffee  Hydration: yes  Nutrition: does not skip meals   Exercise: yes- does not trigger migraines  Analgesic Overuse: Excedrin migraine 2 tablets as needed     Current Medication Regimen:  - zolmitriptan nasal spray  - Topamax 50    Medications Tried: Response  Preventive:  -     Rescue:  - Sumatriptan  100    Medications Not Tried:  -     MEDICAL AND SURGICAL HISTORY:  Past Medical History:   Diagnosis Date    ASTHMA     Back pain     Back pain     Chest tightness     Chickenpox     Cough     Depression     Difficulty breathing     Frequent headaches     Hypertension     Migraine     Shortness of breath     Snoring     Sweat, sweating, excessive     Wears glasses     Wheezing      Past Surgical History:   Procedure Laterality Date    LASER ABLATION  2012    SINUSCOPE      TONSILLECTOMY      age 11    TUBAL COAGULATION LAPAROSCOPIC BILATERAL       MEDICATIONS:  Current Outpatient Medications   Medication Sig    senna-docusate (PERICOLACE OR SENOKOT S) 8.6-50 MG Tab Take 2 Tablets by mouth every evening.    enalapril (VASOTEC) 5 MG Tab Take 5 mg by mouth every day.    ibuprofen (MOTRIN) 200 MG Tab Take 800 mg by mouth every 6 hours as needed for Mild Pain.    zolmitriptan (ZOMIG) 5 MG nasal solution Administer 1 Spray into affected nostril(S) one time as needed for Headache (migraine) for up to 1 dose. Can re-dose in 1-2 hours if no resolution of  "migraine    topiramate (TOPAMAX) 50 MG tablet Take 1 Tablet by mouth every day.    montelukast (SINGULAIR) 10 MG Tab Take 1 Tablet by mouth at bedtime. (Patient taking differently: Take 10 mg by mouth every day.)    Fluticasone-Umeclidin-Vilant (TRELEGY ELLIPTA) 100-62.5-25 MCG/INH AEROSOL POWDER, BREATH ACTIVATED inhalation Inhale 1 Inhalation every day.    pravastatin (PRAVACHOL) 40 MG tablet Take 40 mg by mouth every day.    PARoxetine (PAXIL) 20 MG Tab Take 20 mg by mouth every day.    albuterol 108 (90 Base) MCG/ACT Aero Soln inhalation aerosol Inhale 2 Puffs every four hours as needed for Shortness of Breath.     SOCIAL HISTORY:  Social History     Tobacco Use    Smoking status: Never    Smokeless tobacco: Never   Substance Use Topics    Alcohol use: No     Social History     Social History Narrative    Not on file     FAMILY HISTORY:  Family History   Problem Relation Age of Onset    Breast Cancer Maternal Aunt     Lung Cancer Paternal Grandmother      Ambulatory Vitals  Encounter Vitals  Temperature: 36.4 °C (97.6 °F)  Temp src: Temporal  Blood Pressure: 110/80  Pulse: 66  Respiration: 16  Pulse Oximetry: 98 %  Weight: 84.9 kg (187 lb 2.7 oz)  Height: 165.1 cm (5' 5\") (pt reported)  BMI (Calculated): 31.15     REVIEW OF SYSTEMS:  A ROS was completed.  Pertinent positives and negatives were included in the HPI, above.  All other systems were reviewed and are negative.    PHYSICAL EXAM:  General/Medical:  Martha presents clean and well-dressed.  She does not appear in any acute distress at this time.  She has no malar rash.  She has good range of motion of her neck.  She does report having new onset allergies and has skin testing on her back at this time.  She reports no allodynia.  She reports no jaw claudication or jaw pain.  She has no upper or lower extremity edema.  She has palpable radial pulses.  She has good capillary refill in her upper extremities.  Auscultation of her heart revealed S1 and S2 with no " "abnormal sounds.  Vital signs are listed above and are within normal limits.    Neuro:  MENTAL STATUS: awake and alert; no deficits of speech or language; oriented to person, place, and time; affect was appropriate to situation    CRANIAL NERVES:    II: acuity: J7/J5, fields: intact to confrontation, pupils: 3/3 to 2/2 without a relative afferent pupillary defect, discs: sharp     III/IV/VI: versions: intact without nystagmus    V: facial sensation: symmetric to light touch     VII: facial expression: symmetric    VIII: hearing: intact to finger rub    IX/X: palate: elevates symmetrically    XI: shoulder shrug: symmetric    XII: tongue: midline    MOTOR:  - bulk: normal throughout  - tone: normal throughout  Upper Extremity Strength  (R/L)    5/5    Elbow flexion 5/5    Elbow extension 5/5    Shoulder abduction 5/5      Lower Extremity Strength  (R/L)   Hip flexion 5/5    Knee extension 5/5    Knee flexion 5/5    Ankle plantarflexion 5/5    Ankle dorsiflexion 5/5      - pronator drift: absent   - abnormal movements: none    SENSATION:  - light touch: Intact  - vibration: Intact  - temperature: Intact  - pinprick: NT  - proprioception: NT  - Romberg:absent     COORDINATION:  - finger to nose: normal, no ataxia on exam   - finger tapping: rapid and accurate, bilaterally   - foot tapping: Rapid and accurate bilaterally    REFLEXES:  Reflex Right Left   BR 1+ 1+   Biceps 1+ 1+   Triceps 1+ 1+   Patellae 1+ 1+   Achilles 1+ 1+   Toes NT NT     GAIT:  - narrow base and normal, with normal arm swing  - heel-walk: Intact   - toe-walk: Intact   - tandem gait:intact     REVIEW OF IMAGING STUDIES: I reviewed the following studies:  MRI Brain:  Date: 5/14/04  W/o and w/ contrast?: without  Indication: \"vision loss, body numbness one week ago, tongue numbness\"  Comparison: none  Impression:  1.   MRI OF THE BRAIN WITHOUT CONTRAST WITHIN NORMAL LIMITS.   2.   MODERATE LEFT MAXILLARY SINUS MUCOSAL THICKENING.           REVIEW " OF LABORATORY STUDIES:  No current pertinent labs    ASSESSMENT& PLAN:  1. Migraine with aura and without status migrainosus, not intractable  Martha presents for follow-up to discuss her current medication regimen and its effect on her migraines.  Currently she is on Topamax 50 mg which she takes at night which is helpful to decrease indigestion and nausea.  She reports no other side effects to the medication denies kidney stones.  She uses zolmitriptan nasal spray for her migraine rescue which kills her migraine effectively without having to redose.  Currently she is very happy with her current medication regimen and does not wish to make any changes at this time.  She is due for her full neurological exam.  Her full neurological exam revealed no concerning findings and no changes since last year.  She can contact me via [employer] with any updates, concerns, or questions.  Otherwise I will refill her medications for a year and see her back in a year.  - topiramate (TOPAMAX) 50 MG tablet; Take 1 Tablet by mouth every day.  Dispense: 30 Tablet; Refill: 11  - zolmitriptan (ZOMIG) 5 MG nasal solution; Administer 1 Spray into affected nostril(S) one time as needed for Headache (migraine) for up to 1 dose. Can re-dose in 1-2 hours if no resolution of migraine  Dispense: 9 Each; Refill: 11     BILLING DOCUMENTATION:   I spent 20 minutes in patient care. This includes time with chart review, pre-charting, records review, discussions with other healthcare providers, and documentation. This also includes face-to-face time with the patient for: exam review, discussion and treatment planning.      Estela Velásquez MSN APRN-CNP  [facility] Neurology [place]

## 2025-04-18 NOTE — CERTIFICATION
Advanced Wound Care   [facility]   [address]   [address]   [place] [address]   [phone] Fax: [phone]    Discharge Note      Referring Physician: Rowena Paige  Wound Etiology: abscess  Wound location: Umbilicus  Date of Discharge: 4/18/2025    Assessment:  Discharge patient at this time secondary to wound resolution. New skin is fragile so please be sure to use a good quality moisturizer to keep periwound moist. If wound reopens or you need advanced wound care please obtain a new referral from your PCP. Thank you for the referral and the opportunity to treat your patient.      Clinician Signature: _____________________________ Date:_______________

## 2025-04-22 ENCOUNTER — OFFICE VISIT (OUTPATIENT)
Dept: NEUROLOGY | Facility: MEDICAL CENTER | Age: 50
End: 2025-04-22
Payer: COMMERCIAL

## 2025-04-22 VITALS
SYSTOLIC BLOOD PRESSURE: 110 MMHG | WEIGHT: 187.17 LBS | DIASTOLIC BLOOD PRESSURE: 80 MMHG | HEART RATE: 66 BPM | RESPIRATION RATE: 16 BRPM | HEIGHT: 65 IN | TEMPERATURE: 97.6 F | OXYGEN SATURATION: 98 % | BODY MASS INDEX: 31.18 KG/M2

## 2025-04-22 DIAGNOSIS — G43.109 MIGRAINE WITH AURA AND WITHOUT STATUS MIGRAINOSUS, NOT INTRACTABLE: ICD-10-CM

## 2025-04-22 PROCEDURE — 3074F SYST BP LT 130 MM HG: CPT

## 2025-04-22 PROCEDURE — 99212 OFFICE O/P EST SF 10 MIN: CPT

## 2025-04-22 PROCEDURE — 99213 OFFICE O/P EST LOW 20 MIN: CPT

## 2025-04-22 PROCEDURE — 3079F DIAST BP 80-89 MM HG: CPT

## 2025-04-22 RX ORDER — ZOLMITRIPTAN 5 MG/1
1 SPRAY NASAL
Qty: 9 EACH | Refills: 11 | Status: SHIPPED | OUTPATIENT
Start: 2025-04-22

## 2025-04-22 RX ORDER — TOPIRAMATE 50 MG/1
50 TABLET, FILM COATED ORAL DAILY
Qty: 30 TABLET | Refills: 11 | Status: SHIPPED | OUTPATIENT
Start: 2025-04-22

## 2025-04-22 ASSESSMENT — PATIENT HEALTH QUESTIONNAIRE - PHQ9: CLINICAL INTERPRETATION OF PHQ2 SCORE: 0

## 2025-04-22 ASSESSMENT — FIBROSIS 4 INDEX: FIB4 SCORE: 0.79

## 2025-04-25 ENCOUNTER — APPOINTMENT (OUTPATIENT)
Dept: WOUND CARE | Facility: MEDICAL CENTER | Age: 50
End: 2025-04-25
Attending: NURSE PRACTITIONER
Payer: COMMERCIAL

## 2025-05-02 ENCOUNTER — APPOINTMENT (OUTPATIENT)
Dept: WOUND CARE | Facility: MEDICAL CENTER | Age: 50
End: 2025-05-02
Attending: NURSE PRACTITIONER
Payer: COMMERCIAL

## 2025-06-08 ENCOUNTER — HOSPITAL ENCOUNTER (EMERGENCY)
Facility: MEDICAL CENTER | Age: 50
End: 2025-06-08
Attending: EMERGENCY MEDICINE
Payer: COMMERCIAL

## 2025-06-08 VITALS
OXYGEN SATURATION: 97 % | DIASTOLIC BLOOD PRESSURE: 64 MMHG | TEMPERATURE: 98.2 F | SYSTOLIC BLOOD PRESSURE: 122 MMHG | RESPIRATION RATE: 18 BRPM | HEART RATE: 99 BPM | WEIGHT: 180 LBS | BODY MASS INDEX: 29.99 KG/M2 | HEIGHT: 65 IN

## 2025-06-08 DIAGNOSIS — L55.9 SUNBURN: ICD-10-CM

## 2025-06-08 DIAGNOSIS — F10.920 ALCOHOLIC INTOXICATION WITHOUT COMPLICATION (HCC): Primary | ICD-10-CM

## 2025-06-08 PROCEDURE — 99284 EMERGENCY DEPT VISIT MOD MDM: CPT

## 2025-06-08 RX ORDER — IBUPROFEN 600 MG/1
600 TABLET, FILM COATED ORAL ONCE
Status: DISCONTINUED | OUTPATIENT
Start: 2025-06-08 | End: 2025-06-08 | Stop reason: HOSPADM

## 2025-06-08 ASSESSMENT — FIBROSIS 4 INDEX: FIB4 SCORE: 0.79

## 2025-06-09 NOTE — ED TRIAGE NOTES
Chief Complaint   Patient presents with    Alcohol Intoxication    Sunburn     BIB EMS to [facility] w/ c/o alcohol intoxication and a sunburn.  Per EMS patient was drinking at the [employer] pool with some friends.  Patient passed out in the sun, her friends had difficulty waking her and called security who then called 911.      Patient arrives drowsy, eyes open to loud voice.  Patient admits to drinking numerous alcoholic beverages today.

## 2025-06-09 NOTE — ED PROVIDER NOTES
ER Provider Note    Scribed for Sandor Flores M.d. by Vianey Magaña. 6/8/2025  6:39 PM    Primary Care Provider: Brennon Desai M.D.    CHIEF COMPLAINT  Chief Complaint   Patient presents with    Alcohol Intoxication    Sunburn     EXTERNAL RECORDS REVIEWED  Inpatient Notes History of anxiety, migraines. Hospitalized in march for umbilical abscess.     HPI/ROS  LIMITATION TO HISTORY   Select: Intoxication    OUTSIDE HISTORIAN(S):  Family Daughter at bedside contributing to history as seen below    Jennifer Monge is a 50 y.o. female who presents to the ED via EMS for evaluation of alcohol intoxication onset earlier today. Daughter states patient only consumed 4 alcoholic beverages at the [employer] pool. She reports associated sunburn. Denies fever or chills. No pain or other complaints at this time.    PAST MEDICAL HISTORY  Past Medical History[1]    SURGICAL HISTORY  Past Surgical History[2]    FAMILY HISTORY  Family History   Problem Relation Age of Onset    Breast Cancer Maternal Aunt     Lung Cancer Paternal Grandmother        SOCIAL HISTORY   reports that she has never smoked. She has never used smokeless tobacco. She reports current alcohol use. She reports that she does not use drugs.    CURRENT MEDICATIONS  Discharge Medication List as of 6/8/2025  8:42 PM        CONTINUE these medications which have NOT CHANGED    Details   topiramate (TOPAMAX) 50 MG tablet Take 1 Tablet by mouth every day.Releasing drug to pt preferred pharmacyDisp-30 Tablet, R-11, Normal      zolmitriptan (ZOMIG) 5 MG nasal solution Administer 1 Spray into affected nostril(S) one time as needed for Headache (migraine) for up to 1 dose. Can re-dose in 1-2 hours if no resolution of migraineReleaing to pt's preferred pharmacyDisp-9 Each, R-11, Normal      senna-docusate (PERICOLACE OR SENOKOT S) 8.6-50 MG Tab Take 2 Tablets by mouth every evening., Disp-30 Tablet, R-0, Normal      enalapril (VASOTEC) 5 MG Tab Take 5 mg by mouth every day.,  "Historical Med      ibuprofen (MOTRIN) 200 MG Tab Take 800 mg by mouth every 6 hours as needed for Mild Pain., Historical Med      montelukast (SINGULAIR) 10 MG Tab Take 1 Tablet by mouth at bedtime., Disp-90 Tablet, R-3, Normal      Fluticasone-Umeclidin-Vilant (TRELEGY ELLIPTA) 100-62.5-25 MCG/INH AEROSOL POWDER, BREATH ACTIVATED inhalation Inhale 1 Inhalation every day.Year prescriptionDisp-3 Each, R-3, Normal      pravastatin (PRAVACHOL) 40 MG tablet Take 40 mg by mouth every day., Historical Med      PARoxetine (PAXIL) 20 MG Tab Take 20 mg by mouth every day., Historical Med      albuterol 108 (90 Base) MCG/ACT Aero Soln inhalation aerosol Inhale 2 Puffs every four hours as needed for Shortness of Breath., Disp-8.5 g, R-1, Normal             ALLERGIES  Patient has no known allergies.    PHYSICAL EXAM  VITAL SIGNS: /83   Pulse 71   Temp 36.7 °C (98.1 °F) (Temporal)   Resp 16   Ht 1.651 m (5' 5\")   Wt 81.6 kg (180 lb)   SpO2 93%   BMI 29.95 kg/m²   Pulse ox interpretation: I interpret this pulse ox as normal.  Constitutional: Very drowsy, but arousable.   HENT: No signs of trauma, Bilateral external ears normal, Nose normal.   Eyes: Conjunctiva normal, Non-icteric.   Neck: Normal range of motion, Supple, No stridor.   Lymphatic: No lymphadenopathy noted.   Cardiovascular: Regular rate and rhythm, no murmurs.   Thorax & Lungs: Normal breath sounds, No respiratory distress, No wheezing  Abdomen: Bowel sounds normal, Soft, No tenderness, No masses, No pulsatile masses. No peritoneal signs.  Skin: Warm, Dry, No rash. Mild sunburn to face, neck, and chest. No blistering  Back: No midline bony tenderness.   Extremities: Intact distal pulses, No edema, No cyanosis.  Musculoskeletal: Good range of motion in all major joints. No or major deformities noted.   Neurologic: Alert , Normal motor function, Normal sensory function, No focal deficits noted. Moves all extremities.   Psychiatric: Affect normal. Very " drowsy, but arousable.       COURSE & MEDICAL DECISION MAKING     INITIAL ASSESSMENT, COURSE AND PLAN  Care Narrative:     6:39 PM Patient presents to the ED with alcohol intoxication and sunburn. BIB EMS from [facility]. Patient evaluated at bedside and discussed plan of care, including monitoring her until she is able to walk. Ordered for PO Challenge to evaluate her symptoms.     8:45 PM this patient has walked independently to and from the bathroom with a stable gait.  She tolerated PO challenge.  The patient is stable for discharge at this time and will return for any new or worsening symptoms.  Her daughter will take her home.    DISPOSITION AND DISCUSSIONS  I have discussed management of the patient with the following physicians and ERIK's:  None    Discussion of management with other Q or appropriate source(s): None     Escalation of care considered, and ultimately not performed: acute inpatient care management, however at this time, the patient is most appropriate for outpatient management.  No evidence of trauma, clinically improved, stable independent gait, normal discharge vitals.    Decision tools and prescription drugs considered including, but not limited to: Medication modification considered, but for hangover symptoms and sunburn symptoms, OTC treatments should be sufficient.    The patient will return for new or worsening symptoms and is stable at the time of discharge.    The patient is referred to a primary physician for blood pressure management, diabetic screening, and for all other preventative health concerns.    DISPOSITION:  Patient will be discharged home in stable condition.    FOLLOW UP:  Brennon Desai M.D.  [address]   [place] [id number]  [phone]      As needed      OUTPATIENT MEDICATIONS:  Discharge Medication List as of 6/8/2025  8:42 PM          FINAL DIAGNOSIS  1. Alcoholic intoxication without complication (HCC)    2. Sunburn            IVianey ([place]),  am scribing for, and in the presence of, Sandor Flores M.D..    Electronically signed by: Vianey Magaña (Scribe), 6/8/2025    ISandor M.D. personally performed the services described in this documentation, as scribed by Vianey Magaña in my presence, and it is both accurate and complete.           [1]   Past Medical History:  Diagnosis Date    ASTHMA     Back pain     Back pain     Chest tightness     Chickenpox     Cough     Depression     Difficulty breathing     Frequent headaches     Hypertension     Migraine     Shortness of breath     Snoring     Sweat, sweating, excessive     Wears glasses     Wheezing    [2]   Past Surgical History:  Procedure Laterality Date    LASER ABLATION  2012    SINUSCOPE      TONSILLECTOMY      age 11    TUBAL COAGULATION LAPAROSCOPIC BILATERAL

## 2025-06-09 NOTE — ED NOTES
Received report from MALENA Ng to assume care of pt. At this time.   Pt. Resting on gurney with eyes closed.  Respirations even, non-labored.  Daughter arrived to bedside at this time.

## 2025-06-09 NOTE — ED NOTES
Pt. Unable to wake up and remain alert enough to attempt ambulation at this time.  Pt. Wakes to verbal but falls right back to sleep.

## 2025-06-09 NOTE — ED NOTES
ERP to bedside for evaluation.  Bed alarm remains in use.  Daughter at bedside.  All safety measures maintained.

## 2025-06-09 NOTE — DISCHARGE INSTRUCTIONS
Unfortunately, you will probably feel poorly tomorrow, with hangover symptoms, and sunburn symptoms.  Recommend 600 mg of ibuprofen with food or milk every 6 hours, and aloe spray or gel for sunburn.  Aggressively hydrate with clear, sugar-free fluids.  Unfortunately, there is no way to make people sober up faster, despite lots of people's home remedies that do not actually help.

## 2025-06-09 NOTE — ED NOTES
Daughter here to take pt home.  Pt ambulated to restroom with steady gait.  Refused discharge paperwork.